# Patient Record
Sex: FEMALE | Race: BLACK OR AFRICAN AMERICAN | NOT HISPANIC OR LATINO | Employment: UNEMPLOYED | URBAN - METROPOLITAN AREA
[De-identification: names, ages, dates, MRNs, and addresses within clinical notes are randomized per-mention and may not be internally consistent; named-entity substitution may affect disease eponyms.]

---
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
## 2020-03-16 ENCOUNTER — HOSPITAL ENCOUNTER (EMERGENCY)
Facility: HOSPITAL | Age: 20
Discharge: HOME | End: 2020-03-16
Attending: EMERGENCY MEDICINE
Payer: MEDICAID

## 2020-03-16 VITALS
DIASTOLIC BLOOD PRESSURE: 78 MMHG | BODY MASS INDEX: 30.53 KG/M2 | WEIGHT: 190 LBS | HEIGHT: 66 IN | RESPIRATION RATE: 18 BRPM | HEART RATE: 88 BPM | TEMPERATURE: 98 F | OXYGEN SATURATION: 97 % | SYSTOLIC BLOOD PRESSURE: 118 MMHG

## 2020-03-16 DIAGNOSIS — N76.0 BV (BACTERIAL VAGINOSIS): Primary | ICD-10-CM

## 2020-03-16 DIAGNOSIS — B96.89 BV (BACTERIAL VAGINOSIS): Primary | ICD-10-CM

## 2020-03-16 DIAGNOSIS — R05.9 COUGH: ICD-10-CM

## 2020-03-16 LAB
B-HCG UR QL: NEGATIVE
BACTERIA GENITAL QL WET PREP: ABNORMAL
BILIRUB UR QL STRIP: NEGATIVE
C TRACH DNA SPEC QL NAA+PROBE: NOT DETECTED
CLARITY UR: CLEAR
CLUE CELLS VAG QL WET PREP: ABNORMAL
COLOR UR: NORMAL
CTP QC/QA: YES
FILAMENT FUNGI VAG WET PREP-#/AREA: ABNORMAL
GLUCOSE UR QL STRIP: NEGATIVE
HGB UR QL STRIP: NEGATIVE
KETONES UR QL STRIP: NEGATIVE
LEUKOCYTE ESTERASE UR QL STRIP: NEGATIVE
MICROSCOPIC COMMENT: NORMAL
N GONORRHOEA DNA SPEC QL NAA+PROBE: NOT DETECTED
NITRITE UR QL STRIP: NEGATIVE
PH UR STRIP: 7 [PH] (ref 5–8)
PROT UR QL STRIP: NEGATIVE
SP GR UR STRIP: 1 (ref 1–1.03)
SPECIMEN SOURCE: ABNORMAL
SQUAMOUS #/AREA URNS HPF: 1 /HPF
T VAGINALIS GENITAL QL WET PREP: ABNORMAL
URN SPEC COLLECT METH UR: NORMAL
UROBILINOGEN UR STRIP-ACNC: NEGATIVE EU/DL
WBC #/AREA VAG WET PREP: ABNORMAL
YEAST GENITAL QL WET PREP: ABNORMAL

## 2020-03-16 PROCEDURE — 81025 URINE PREGNANCY TEST: CPT | Performed by: NURSE PRACTITIONER

## 2020-03-16 PROCEDURE — 81000 URINALYSIS NONAUTO W/SCOPE: CPT

## 2020-03-16 PROCEDURE — 99284 EMERGENCY DEPT VISIT MOD MDM: CPT | Mod: 25

## 2020-03-16 PROCEDURE — 25000003 PHARM REV CODE 250: Performed by: NURSE PRACTITIONER

## 2020-03-16 PROCEDURE — 87210 SMEAR WET MOUNT SALINE/INK: CPT

## 2020-03-16 PROCEDURE — 87491 CHLMYD TRACH DNA AMP PROBE: CPT

## 2020-03-16 RX ORDER — BENZONATATE 100 MG/1
100 CAPSULE ORAL 3 TIMES DAILY PRN
Qty: 20 CAPSULE | Refills: 0 | Status: SHIPPED | OUTPATIENT
Start: 2020-03-16 | End: 2020-03-26

## 2020-03-16 RX ORDER — BENZONATATE 100 MG/1
100 CAPSULE ORAL
Status: COMPLETED | OUTPATIENT
Start: 2020-03-16 | End: 2020-03-16

## 2020-03-16 RX ORDER — METRONIDAZOLE 500 MG/1
500 TABLET ORAL 2 TIMES DAILY
Qty: 14 TABLET | Refills: 0 | Status: SHIPPED | OUTPATIENT
Start: 2020-03-16 | End: 2020-03-23

## 2020-03-16 RX ADMIN — BENZONATATE 100 MG: 100 CAPSULE ORAL at 08:03

## 2020-03-16 NOTE — ED PROVIDER NOTES
Encounter Date: 3/16/2020    SCRIBE #1 NOTE: I, Savana Sherman, am scribing for, and in the presence of,  Carole Lawrence NP. I have scribed the following portions of the note - Other sections scribed: HPI, ROS.       History     Chief Complaint   Patient presents with    Cough     cough for the past week and progressively getting worse. Took OTC Tylenol cold and flu without relief. Vaginal discharge/odor for the past week or so.     Vaginal Discharge     CC: Cough    HPI: This 19 y.o female, with no medical history, presents to the ED c/o a constant cough for the last 1x week. Pt reports also experiencing associated nasal congestion. She notes use of cold and flu medication for treatment without any improvement. She denies fever, chills or shortness of breath. No recent travel. No known sick contacts. Pt additionally c/o vaginal discharge. She reports experiencing bacterial vaginosis in the past and notes that the present symptoms are the same. Pt denies engaging in unprotected sexual intercourse. She has no concern for STD's and denies pain or any urinary symptoms. No other associated symptoms.    The history is provided by the patient.     Review of patient's allergies indicates:   Allergen Reactions    Peanut      History reviewed. No pertinent past medical history.  Past Surgical History:   Procedure Laterality Date    TONSILLECTOMY       History reviewed. No pertinent family history.  Social History     Tobacco Use    Smoking status: Never Smoker    Smokeless tobacco: Never Used   Substance Use Topics    Alcohol use: No    Drug use: Never     Review of Systems   Constitutional: Negative for chills and fever.   HENT: Positive for congestion (nasal). Negative for sore throat.    Eyes: Negative for visual disturbance.   Respiratory: Positive for cough. Negative for shortness of breath.    Cardiovascular: Negative for chest pain.   Gastrointestinal: Negative for nausea.   Genitourinary: Positive for  vaginal discharge. Negative for dysuria.   Musculoskeletal: Negative for neck pain.   Skin: Negative for rash.   Neurological: Negative for headaches.       Physical Exam     Initial Vitals [03/16/20 0657]   BP Pulse Resp Temp SpO2   118/83 84 20 98.2 °F (36.8 °C) 98 %      MAP       --         Physical Exam    Constitutional: She appears well-developed and well-nourished. She is not diaphoretic. No distress.   HENT:   Head: Normocephalic and atraumatic.   Right Ear: Hearing, tympanic membrane, external ear and ear canal normal.   Left Ear: Hearing, tympanic membrane, external ear and ear canal normal.   Nose: Mucosal edema present.   Mouth/Throat: No oropharyngeal exudate or posterior oropharyngeal erythema.   Eyes: Conjunctivae and EOM are normal. Pupils are equal, round, and reactive to light. Right eye exhibits no discharge. Left eye exhibits no discharge.   Neck: Normal range of motion. Neck supple.   Cardiovascular: Normal rate, regular rhythm, normal heart sounds and intact distal pulses.   Pulmonary/Chest: Breath sounds normal. No respiratory distress.   Abdominal: Soft. Bowel sounds are normal. There is no hepatosplenomegaly. There is no tenderness. There is no rigidity, no rebound, no guarding, no CVA tenderness, no tenderness at McBurney's point and negative Becerra's sign. No hernia.   Genitourinary:   Genitourinary Comments:  exam deferred by patient.   Musculoskeletal: Normal range of motion.   Neurological: She is alert and oriented to person, place, and time.   Skin: Skin is warm and dry.   Psychiatric: She has a normal mood and affect. Her behavior is normal.         ED Course   Procedures  Labs Reviewed   VAGINAL SCREEN - Abnormal; Notable for the following components:       Result Value    Clue Cells Few (*)     Bacteria - Vaginal Screen Few (*)     All other components within normal limits   C. TRACHOMATIS/N. GONORRHOEAE BY AMP DNA   URINALYSIS, REFLEX TO URINE CULTURE    Narrative:      Preferred Collection Type->Urine, Clean Catch   URINALYSIS MICROSCOPIC    Narrative:     Preferred Collection Type->Urine, Clean Catch   POCT URINE PREGNANCY          Imaging Results          X-Ray Chest PA And Lateral (Final result)  Result time 03/16/20 07:57:53    Final result by Kyle Massey MD (03/16/20 07:57:53)                 Impression:      There is no radiographic evidence for acute intrathoracic process.      Electronically signed by: Kyle Massey  Date:    03/16/2020  Time:    07:57             Narrative:    EXAMINATION:  XR CHEST PA AND LATERAL    CLINICAL HISTORY:  Cough    TECHNIQUE:  PA and lateral views of the chest were performed.    COMPARISON:  October 4, 2019    FINDINGS:  Two views of the chest are submitted.  The cardiomediastinal silhouette appears appropriate.  There is no evidence for confluent infiltrate or consolidation, significant pleural effusion or pneumothorax.  The osseous structures appear intact.                                 Medical Decision Making:   ED Management:  This is a 19-year-old female presenting for evaluation of cough x1 week.  No fever, chills, shortness of breath.  No recent travel.  Chest x-ray normal.  No pneumonia, pleural effusion, pneumothorax.  She is not hypoxic.  She is afebrile. Patient has no symptoms of respiratory distress. Patient denies any close contact with COVID-19 patient within the last 14 days.  Patient has not traveled outside of the country recently.  According to the CDC clinical criteria, the patient is not a person under investigation for Covid- 2019.  The patient will be referred to our Covid-19 testing center should the patient wish to be tested.    She also complaining of vaginal discharge.  BV present on vaginal swab.  No UTI.  She has no pain.  Doubt cervicitis, PID, TOA.  She is not concerned for STDs.  Will discharge patient with Flagyl.  Follow up with OBGYN for further evaluation.            Scribe Attestation:   Scribe  #1: I performed the above scribed service and the documentation accurately describes the services I performed. I attest to the accuracy of the note.                          Clinical Impression:       ICD-10-CM ICD-9-CM   1. BV (bacterial vaginosis) N76.0 616.10    B96.89 041.9   2. Cough R05 786.2         Disposition:   Disposition: Discharged  Condition: Stable     ED Disposition Condition    Discharge Stable        ED Prescriptions     Medication Sig Dispense Start Date End Date Auth. Provider    metroNIDAZOLE (FLAGYL) 500 MG tablet Take 1 tablet (500 mg total) by mouth 2 (two) times daily. for 7 days 14 tablet 3/16/2020 3/23/2020 Carole Lawrence NP    benzonatate (TESSALON) 100 MG capsule Take 1 capsule (100 mg total) by mouth 3 (three) times daily as needed for Cough. 20 capsule 3/16/2020 3/26/2020 Carole Lawrence NP        Follow-up Information     Follow up With Specialties Details Why Contact Info    Evert Pearson MD Neonatology Schedule an appointment as soon as possible for a visit  For follow-up [address] [facility] [address] [place] [address] [phone]      [facility]  Schedule an appointment as soon as possible for a visit  For follow-up [address] [facility] [address] [facility] [id number]    [facility] Go to  If symptoms worsen [address]  [facility] [address] [phone]                      I, LESTER Lawrence, personally performed the services described in this documentation. All medical record entries made by the scribe were at my direction and in my presence. I have reviewed the chart and agree that the record reflects my personal performance and is accurate and complete.               Carole Lawrence NP  03/16/20 0858

## 2020-03-16 NOTE — ED TRIAGE NOTES
Pt complains of productive cough and congestion x1 week.  Pt also is requesting an antibiotic for BV.  States her OB put on antibiotics and she took them all but the BV keeps occurring.

## 2020-03-16 NOTE — DISCHARGE INSTRUCTIONS

## 2020-11-18 ENCOUNTER — HOSPITAL ENCOUNTER (EMERGENCY)
Facility: OTHER | Age: 20
Discharge: HOME | End: 2020-11-18
Attending: EMERGENCY MEDICINE
Payer: MEDICAID

## 2020-11-18 VITALS
HEIGHT: 66 IN | RESPIRATION RATE: 16 BRPM | TEMPERATURE: 99 F | WEIGHT: 145 LBS | DIASTOLIC BLOOD PRESSURE: 66 MMHG | SYSTOLIC BLOOD PRESSURE: 125 MMHG | OXYGEN SATURATION: 99 % | BODY MASS INDEX: 23.3 KG/M2 | HEART RATE: 77 BPM

## 2020-11-18 DIAGNOSIS — N76.0 BV (BACTERIAL VAGINOSIS): ICD-10-CM

## 2020-11-18 DIAGNOSIS — B96.89 BV (BACTERIAL VAGINOSIS): ICD-10-CM

## 2020-11-18 DIAGNOSIS — R10.9 ABDOMINAL CRAMPING: Primary | ICD-10-CM

## 2020-11-18 LAB
ALBUMIN SERPL BCP-MCNC: 4 G/DL (ref 3.5–5.2)
ALP SERPL-CCNC: 81 U/L (ref 55–135)
ALT SERPL W/O P-5'-P-CCNC: 16 U/L (ref 10–44)
ANION GAP SERPL CALC-SCNC: 8 MMOL/L (ref 8–16)
AST SERPL-CCNC: 25 U/L (ref 10–40)
B-HCG UR QL: NEGATIVE
BACTERIA GENITAL QL WET PREP: ABNORMAL
BASOPHILS # BLD AUTO: 0.05 K/UL (ref 0–0.2)
BASOPHILS NFR BLD: 0.7 % (ref 0–1.9)
BILIRUB SERPL-MCNC: 0.3 MG/DL (ref 0.1–1)
BILIRUB UR QL STRIP: NEGATIVE
BUN SERPL-MCNC: 10 MG/DL (ref 6–20)
CALCIUM SERPL-MCNC: 9.5 MG/DL (ref 8.7–10.5)
CHLORIDE SERPL-SCNC: 104 MMOL/L (ref 95–110)
CLARITY UR: CLEAR
CLUE CELLS VAG QL WET PREP: ABNORMAL
CO2 SERPL-SCNC: 26 MMOL/L (ref 23–29)
COLOR UR: YELLOW
CREAT SERPL-MCNC: 0.7 MG/DL (ref 0.5–1.4)
CTP QC/QA: YES
DIFFERENTIAL METHOD: NORMAL
EOSINOPHIL # BLD AUTO: 0.3 K/UL (ref 0–0.5)
EOSINOPHIL NFR BLD: 3.7 % (ref 0–8)
ERYTHROCYTE [DISTWIDTH] IN BLOOD BY AUTOMATED COUNT: 12.1 % (ref 11.5–14.5)
EST. GFR  (AFRICAN AMERICAN): >60 ML/MIN/1.73 M^2
EST. GFR  (NON AFRICAN AMERICAN): >60 ML/MIN/1.73 M^2
EXTRA BLUE TOP RAINBOW: NORMAL
EXTRA GREEN TOP RAINBOW: NORMAL
EXTRA LAVENDER TOP RAINBOW: NORMAL
FILAMENT FUNGI VAG WET PREP-#/AREA: ABNORMAL
GLUCOSE SERPL-MCNC: 87 MG/DL (ref 70–110)
GLUCOSE UR QL STRIP: NEGATIVE
HCG INTACT+B SERPL-ACNC: <1.2 MIU/ML
HCT VFR BLD AUTO: 40.6 % (ref 37–48.5)
HCV AB SERPL QL IA: NEGATIVE
HGB BLD-MCNC: 13.3 G/DL (ref 12–16)
HGB UR QL STRIP: NEGATIVE
HIV 1+2 AB+HIV1 P24 AG SERPL QL IA: NEGATIVE
IMM GRANULOCYTES # BLD AUTO: 0.02 K/UL (ref 0–0.04)
IMM GRANULOCYTES NFR BLD AUTO: 0.3 % (ref 0–0.5)
KETONES UR QL STRIP: NEGATIVE
LEUKOCYTE ESTERASE UR QL STRIP: NEGATIVE
LYMPHOCYTES # BLD AUTO: 2.5 K/UL (ref 1–4.8)
LYMPHOCYTES NFR BLD: 33.7 % (ref 18–48)
MCH RBC QN AUTO: 29.8 PG (ref 27–31)
MCHC RBC AUTO-ENTMCNC: 32.8 G/DL (ref 32–36)
MCV RBC AUTO: 91 FL (ref 82–98)
MONOCYTES # BLD AUTO: 0.7 K/UL (ref 0.3–1)
MONOCYTES NFR BLD: 9.1 % (ref 4–15)
NEUTROPHILS # BLD AUTO: 4 K/UL (ref 1.8–7.7)
NEUTROPHILS NFR BLD: 52.5 % (ref 38–73)
NITRITE UR QL STRIP: NEGATIVE
NRBC BLD-RTO: 0 /100 WBC
PH UR STRIP: 8.5 [PH] (ref 5–8)
PLATELET # BLD AUTO: 298 K/UL (ref 150–350)
PMV BLD AUTO: 9.7 FL (ref 9.2–12.9)
POTASSIUM SERPL-SCNC: 4.4 MMOL/L (ref 3.5–5.1)
PROT SERPL-MCNC: 7.9 G/DL (ref 6–8.4)
PROT UR QL STRIP: NEGATIVE
RBC # BLD AUTO: 4.46 M/UL (ref 4–5.4)
SODIUM SERPL-SCNC: 138 MMOL/L (ref 136–145)
SP GR UR STRIP: 1.01 (ref 1–1.03)
SPECIMEN SOURCE: ABNORMAL
T VAGINALIS GENITAL QL WET PREP: ABNORMAL
URN SPEC COLLECT METH UR: NORMAL
UROBILINOGEN UR STRIP-ACNC: NEGATIVE EU/DL
WBC # BLD AUTO: 7.51 K/UL (ref 3.9–12.7)
WBC #/AREA VAG WET PREP: ABNORMAL
YEAST GENITAL QL WET PREP: ABNORMAL

## 2020-11-18 PROCEDURE — 99284 EMERGENCY DEPT VISIT MOD MDM: CPT

## 2020-11-18 PROCEDURE — 80053 COMPREHEN METABOLIC PANEL: CPT

## 2020-11-18 PROCEDURE — 87210 SMEAR WET MOUNT SALINE/INK: CPT

## 2020-11-18 PROCEDURE — 86703 HIV-1/HIV-2 1 RESULT ANTBDY: CPT

## 2020-11-18 PROCEDURE — 85025 COMPLETE CBC W/AUTO DIFF WBC: CPT

## 2020-11-18 PROCEDURE — 86803 HEPATITIS C AB TEST: CPT

## 2020-11-18 PROCEDURE — 81025 URINE PREGNANCY TEST: CPT | Performed by: EMERGENCY MEDICINE

## 2020-11-18 PROCEDURE — 81003 URINALYSIS AUTO W/O SCOPE: CPT

## 2020-11-18 PROCEDURE — 84702 CHORIONIC GONADOTROPIN TEST: CPT

## 2020-11-18 RX ORDER — METRONIDAZOLE 500 MG/1
500 TABLET ORAL EVERY 12 HOURS
Qty: 14 TABLET | Refills: 0 | Status: SHIPPED | OUTPATIENT
Start: 2020-11-18 | End: 2020-11-25

## 2020-11-18 RX ORDER — ACETAMINOPHEN 500 MG
1000 TABLET ORAL EVERY 6 HOURS PRN
Qty: 30 TABLET | Refills: 0 | Status: SHIPPED | OUTPATIENT
Start: 2020-11-18 | End: 2024-04-01

## 2020-11-18 NOTE — Clinical Note
"Cole"Velma Jackson was seen and treated in our emergency department on 11/18/2020.  She may return to work on 11/19/2020.       If you have any questions or concerns, please don't hesitate to call.      NIMA Jackson"

## 2020-11-18 NOTE — Clinical Note
"Garett Santiago accompanied Cole Jackson (Zmia) to the emergency department on 11/18/2020. They may return to work on 11/19/2020.      If you have any questions or concerns, please don't hesitate to call.      NIMA Jackson"

## 2020-11-19 NOTE — ED PROVIDER NOTES
Encounter Date: 2020       History     Chief Complaint   Patient presents with    Abdominal Cramping     to low abd since yesterday, LMP 10/22, trying to get pregnant, took UPT this am and it was negative     Afebrile 19 y.o. female who is  presents to the ED for evaluation of lower abdominal cramping.  Patient states that symptoms began yesterday.  She describes the symptoms as intermittent.  She c/o of increased vaginal discharge. She does report that her last menstrual period was on 10/22 and that her cycles are typically regular with no reported history of dysmenorrhea.  She states that she is attempting to get pregnant.  She states that she took a home pregnancy test which is negative this morning.  She states that she attempted to follow up with her gynecologist at [facility] however was reportedly told they would not see her without a positive UPT.  She does report a history of recurrent BV and that discharge feels similar to previous episodes although one episode of blood tinged discharge.  She denies any concern of STI. She has not tired ct medications for the symptoms. She denies any fever, upper abdominal pain, N/V/D or dysuria.     The history is provided by the patient.     Review of patient's allergies indicates:   Allergen Reactions    Peanut      History reviewed. No pertinent past medical history.  Past Surgical History:   Procedure Laterality Date    TONSILLECTOMY       History reviewed. No pertinent family history.  Social History     Tobacco Use    Smoking status: Never Smoker    Smokeless tobacco: Never Used   Substance Use Topics    Alcohol use: No    Drug use: Never     Review of Systems   Constitutional: Negative for appetite change, chills and fever.   HENT: Negative for sore throat.    Respiratory: Negative for shortness of breath.    Cardiovascular: Negative for chest pain.   Gastrointestinal: Positive for abdominal pain (abdominal cramping). Negative for constipation,  diarrhea, nausea and vomiting.   Genitourinary: Positive for pelvic pain and vaginal discharge. Negative for dysuria, flank pain, hematuria and vaginal bleeding.   Musculoskeletal: Negative for back pain.   Skin: Negative for rash.   Allergic/Immunologic: Negative for immunocompromised state.   Neurological: Negative for weakness.   Hematological: Does not bruise/bleed easily.       Physical Exam     Initial Vitals [11/18/20 1909]   BP Pulse Resp Temp SpO2   124/69 90 18 98.6 °F (37 °C) 100 %      MAP       --         Physical Exam    Nursing note and vitals reviewed.  Constitutional: Vital signs are normal. She appears well-developed and well-nourished. She is cooperative.  Non-toxic appearance. She does not appear ill. No distress.   HENT:   Head: Normocephalic and atraumatic.   Eyes: Conjunctivae and lids are normal.   Neck: Neck supple. No neck rigidity.   Cardiovascular: Normal rate and regular rhythm.   Pulmonary/Chest: Breath sounds normal. No respiratory distress. She has no wheezes. She has no rhonchi.   Abdominal: Soft. Normal appearance. There is no abdominal tenderness. There is no rigidity and no guarding.   Genitourinary:    Pelvic exam was performed with patient supine.   Uterus is not enlarged and not tender. Cervix exhibits no motion tenderness. Right adnexum displays no tenderness and no fullness. Left adnexum displays no tenderness and no fullness.    Vaginal discharge and erythema present.   There is erythema in the vagina.   Musculoskeletal: Normal range of motion.   Neurological: She is alert and oriented to person, place, and time. GCS eye subscore is 4. GCS verbal subscore is 5. GCS motor subscore is 6.   Skin: Skin is warm, dry and intact. No rash noted.   Psychiatric: She has a normal mood and affect. Her speech is normal and behavior is normal. Thought content normal.         ED Course   Procedures  Labs Reviewed   VAGINAL SCREEN - Abnormal; Notable for the following components:        Result Value    Clue Cells Occasional (*)     WBC - Vaginal Screen Rare (*)     Bacteria - Vaginal Screen Many (*)     All other components within normal limits   HIV 1 / 2 ANTIBODY   HEPATITIS C ANTIBODY   URINALYSIS, REFLEX TO URINE CULTURE    Narrative:     Specimen Source->Urine   BLUE-TOP TUBE   GREEN-TOP TUBE   LAVENDER-TOP TUBE   CBC W/ AUTO DIFFERENTIAL   COMPREHENSIVE METABOLIC PANEL   HCG, QUANTITATIVE, PREGNANCY   POCT URINE PREGNANCY          Imaging Results    None          Medical Decision Making:   History:   Old Medical Records: I decided to obtain old medical records.  Initial Assessment:   Well-appearing female presents the ED for evaluation of emergent lower abdominal cramping.  She expresses concern of possible pregnancy.  Last menstrual period was 10/22; negative UPT at home today.  She appears in no distress.  Lungs CTA; heart regular rate rhythm.  Abdomen is soft and nontender.   exam reveals mild erythema of the vaginal canal with moderate white discharge; no cervical motion tenderness, adnexal tenderness or bleeding noted.  Differential Diagnosis:   Differential Diagnosis includes, but is not limited to:  STI, HSV, BV, PID, TOA, vaginal foreign body, vaginal trauma, ovarian torsion, ovarian cyst, UTI/pyelonephritis, pregnancy complication, dysmenorrhea, mittelschmertz, appendicitis, nephrolithiasis, appendicitis, colitis, diverticulitis,    Clinical Tests:   Lab Tests: Ordered and Reviewed  ED Management:  Patient continues to be well-appearing in no distress however given concern of pregnancy UPT was obtained which is negative.  We did discuss that this could be related to early pregnancy given her last menstrual period was just 10/22.  Patient was requesting a blood test.  We did discuss not emergent at this time given no abdominal tenderness a significant abnormalities of work up however given that she has some abdominal pain obliged.  Labs reveal clue cells consistent with BV.   Remaining workup unremarkable.  Did consider TOA or ectopic however low suspicion given normal HCG and no significant tenderness on  exam are abdominal exam.  Discussed Tylenol for pain and will send home with Flagyl for BV. Strict instructions to follow up with primary care physician or reference provided for further assessment and evaluation. Given instructions to return for any acute symptoms and verbalized understanding of this medical plan.                               Clinical Impression:       ICD-10-CM ICD-9-CM   1. Abdominal cramping  R10.9 789.00   2. BV (bacterial vaginosis)  N76.0 616.10    B96.89 041.9                          ED Disposition Condition    Discharge Stable        ED Prescriptions     Medication Sig Dispense Start Date End Date Auth. Provider    metroNIDAZOLE (FLAGYL) 500 MG tablet Take 1 tablet (500 mg total) by mouth every 12 (twelve) hours. for 7 days 14 tablet 11/18/2020 11/25/2020 NIMA Jackson    acetaminophen (TYLENOL) 500 MG tablet Take 2 tablets (1,000 mg total) by mouth every 6 (six) hours as needed. 30 tablet 11/18/2020  NIMA Jackson        Follow-up Information     Follow up With Specialties Details Why Contact Info    Your Ob/GYN  Go to                                          NIMA Jackson  11/19/20 2009

## 2020-11-19 NOTE — ED TRIAGE NOTES
"Pt reports to ED with multiple complaints. Pt states she has been feeling some generealized fatigue and body soreness that has been going on since yesterday. Pt is also reporting some intermittent lower abd cramping. Pt reports some nausea, no vomiting. States she has "been trying to get pregnant," LMP 10/22  "

## 2020-12-09 ENCOUNTER — HOSPITAL ENCOUNTER (EMERGENCY)
Facility: OTHER | Age: 20
Discharge: HOME | End: 2020-12-09
Attending: EMERGENCY MEDICINE
Payer: MEDICAID

## 2020-12-09 VITALS
OXYGEN SATURATION: 98 % | SYSTOLIC BLOOD PRESSURE: 111 MMHG | TEMPERATURE: 98 F | BODY MASS INDEX: 30.18 KG/M2 | DIASTOLIC BLOOD PRESSURE: 60 MMHG | RESPIRATION RATE: 18 BRPM | WEIGHT: 187 LBS | HEART RATE: 64 BPM

## 2020-12-09 DIAGNOSIS — O46.90 VAGINAL BLEEDING IN PREGNANCY: Primary | ICD-10-CM

## 2020-12-09 LAB
ABO + RH BLD: NORMAL
ALBUMIN SERPL BCP-MCNC: 3.9 G/DL (ref 3.5–5.2)
ALP SERPL-CCNC: 58 U/L (ref 55–135)
ALT SERPL W/O P-5'-P-CCNC: 17 U/L (ref 10–44)
ANION GAP SERPL CALC-SCNC: 6 MMOL/L (ref 8–16)
AST SERPL-CCNC: 21 U/L (ref 10–40)
B-HCG UR QL: POSITIVE
BACTERIA GENITAL QL WET PREP: ABNORMAL
BASOPHILS # BLD AUTO: 0.06 K/UL (ref 0–0.2)
BASOPHILS NFR BLD: 0.7 % (ref 0–1.9)
BILIRUB SERPL-MCNC: 0.3 MG/DL (ref 0.1–1)
BUN SERPL-MCNC: 7 MG/DL (ref 6–20)
CALCIUM SERPL-MCNC: 9.3 MG/DL (ref 8.7–10.5)
CHLORIDE SERPL-SCNC: 103 MMOL/L (ref 95–110)
CLUE CELLS VAG QL WET PREP: ABNORMAL
CO2 SERPL-SCNC: 28 MMOL/L (ref 23–29)
CREAT SERPL-MCNC: 0.7 MG/DL (ref 0.5–1.4)
CTP QC/QA: YES
DIFFERENTIAL METHOD: ABNORMAL
EOSINOPHIL # BLD AUTO: 0.3 K/UL (ref 0–0.5)
EOSINOPHIL NFR BLD: 3.4 % (ref 0–8)
ERYTHROCYTE [DISTWIDTH] IN BLOOD BY AUTOMATED COUNT: 12.4 % (ref 11.5–14.5)
EST. GFR  (AFRICAN AMERICAN): >60 ML/MIN/1.73 M^2
EST. GFR  (NON AFRICAN AMERICAN): >60 ML/MIN/1.73 M^2
FILAMENT FUNGI VAG WET PREP-#/AREA: ABNORMAL
GLUCOSE SERPL-MCNC: 91 MG/DL (ref 70–110)
HCG INTACT+B SERPL-ACNC: 7668 MIU/ML
HCT VFR BLD AUTO: 36.8 % (ref 37–48.5)
HGB BLD-MCNC: 12.2 G/DL (ref 12–16)
IMM GRANULOCYTES # BLD AUTO: 0.02 K/UL (ref 0–0.04)
IMM GRANULOCYTES NFR BLD AUTO: 0.2 % (ref 0–0.5)
LYMPHOCYTES # BLD AUTO: 2.8 K/UL (ref 1–4.8)
LYMPHOCYTES NFR BLD: 33.9 % (ref 18–48)
MCH RBC QN AUTO: 29.8 PG (ref 27–31)
MCHC RBC AUTO-ENTMCNC: 33.2 G/DL (ref 32–36)
MCV RBC AUTO: 90 FL (ref 82–98)
MONOCYTES # BLD AUTO: 0.7 K/UL (ref 0.3–1)
MONOCYTES NFR BLD: 7.9 % (ref 4–15)
NEUTROPHILS # BLD AUTO: 4.5 K/UL (ref 1.8–7.7)
NEUTROPHILS NFR BLD: 53.9 % (ref 38–73)
NRBC BLD-RTO: 0 /100 WBC
PLATELET # BLD AUTO: 311 K/UL (ref 150–350)
PMV BLD AUTO: 9.3 FL (ref 9.2–12.9)
POTASSIUM SERPL-SCNC: 3.6 MMOL/L (ref 3.5–5.1)
PROT SERPL-MCNC: 7.2 G/DL (ref 6–8.4)
RBC # BLD AUTO: 4.09 M/UL (ref 4–5.4)
SODIUM SERPL-SCNC: 137 MMOL/L (ref 136–145)
SPECIMEN SOURCE: ABNORMAL
T VAGINALIS GENITAL QL WET PREP: ABNORMAL
WBC # BLD AUTO: 8.35 K/UL (ref 3.9–12.7)
WBC #/AREA VAG WET PREP: ABNORMAL
YEAST GENITAL QL WET PREP: ABNORMAL

## 2020-12-09 PROCEDURE — 84702 CHORIONIC GONADOTROPIN TEST: CPT

## 2020-12-09 PROCEDURE — 86901 BLOOD TYPING SEROLOGIC RH(D): CPT

## 2020-12-09 PROCEDURE — 80053 COMPREHEN METABOLIC PANEL: CPT

## 2020-12-09 PROCEDURE — 99284 EMERGENCY DEPT VISIT MOD MDM: CPT | Mod: 25

## 2020-12-09 PROCEDURE — 81025 URINE PREGNANCY TEST: CPT | Performed by: PHYSICIAN ASSISTANT

## 2020-12-09 PROCEDURE — 87210 SMEAR WET MOUNT SALINE/INK: CPT

## 2020-12-09 PROCEDURE — 85025 COMPLETE CBC W/AUTO DIFF WBC: CPT

## 2020-12-10 NOTE — ED PROVIDER NOTES
Encounter Date: 2020    SCRIBE #1 NOTE: IStephanie am scribing for, and in the presence of, Dr. Haddad.   SCRIBE #2 NOTE: ILeticia am scribing for, and in the presence of, Dr. Haddad.     History     Chief Complaint   Patient presents with    Vaginal Bleeding     reports spotting with onset today, reports treating yeast infection, reports 7W pregnant     Time seen by provider: 9:24 PM    This is a 19 y.o. female A0 who presents with complaint of vaginal bleeding (described as spotting) and cramping for 3 days. She reports currently treating a yeast infection that began 5 days ago with cream. She notes that she first discovered the spotting of blood on the applicator of the yeast infection cream. The cramping is described as a 6/10, worse tonight. She notes feeling dehydrated and fatigued in general. She denies any fever, chills, cough, rhinorrhea, decreased urine, or painful urination. She reports PSHx of tonsillectomy. This is the extent of the patient's complaints at this time.    The history is provided by the patient.     Review of patient's allergies indicates:   Allergen Reactions    Peanut      History reviewed. No pertinent past medical history.  Past Surgical History:   Procedure Laterality Date    TONSILLECTOMY       History reviewed. No pertinent family history.  Social History     Tobacco Use    Smoking status: Never Smoker    Smokeless tobacco: Never Used   Substance Use Topics    Alcohol use: No    Drug use: Never     Review of Systems   Constitutional: Positive for fatigue. Negative for chills and fever.   HENT: Negative for rhinorrhea and sore throat.    Eyes: Negative for redness.   Respiratory: Negative for cough.    Cardiovascular: Negative for chest pain.   Gastrointestinal: Negative for diarrhea, nausea and vomiting.        Positive for abdominal cramping.   Genitourinary: Positive for vaginal bleeding. Negative for decreased urine volume and dysuria.    Musculoskeletal: Negative for back pain.   Skin: Negative for pallor.   Neurological: Negative for headaches.       Physical Exam     Initial Vitals [12/09/20 2056]   BP Pulse Resp Temp SpO2   131/60 77 18 98.2 °F (36.8 °C) 99 %      MAP       --         Physical Exam    Nursing note and vitals reviewed.  Constitutional: She appears well-developed and well-nourished. She is not diaphoretic. No distress.   HENT:   Head: Normocephalic and atraumatic.   Eyes: EOM are normal. Pupils are equal, round, and reactive to light.   Neck: Normal range of motion. Neck supple.   Cardiovascular: Normal rate, regular rhythm and normal heart sounds. Exam reveals no gallop and no friction rub.    No murmur heard.  Pulmonary/Chest: Breath sounds normal. No respiratory distress. She has no wheezes. She has no rhonchi. She has no rales.   Abdominal: Soft. Bowel sounds are normal. She exhibits no distension. There is no abdominal tenderness. There is no rebound and no guarding.   Genitourinary:    Genitourinary Comments: Pelvic exam:  Normal external genitalia.  There is no evidence of current or recent bleeding, no blood evident in vaginal vault.  Cervix appears normal.  There is moderate white discharge present.  Cervix is closed.  No CMT.     Musculoskeletal: Normal range of motion. No tenderness or edema.   Neurological: She is alert and oriented to person, place, and time.   Skin: Skin is warm and dry.   Psychiatric: She has a normal mood and affect. Her behavior is normal. Thought content normal.         ED Course   Procedures  Labs Reviewed   CBC W/ AUTO DIFFERENTIAL - Abnormal; Notable for the following components:       Result Value    Hematocrit 36.8 (*)     All other components within normal limits   COMPREHENSIVE METABOLIC PANEL - Abnormal; Notable for the following components:    Anion Gap 6 (*)     All other components within normal limits   VAGINAL SCREEN - Abnormal; Notable for the following components:    Clue Cells  Moderate (*)     WBC - Vaginal Screen Occasional (*)     Bacteria - Vaginal Screen Moderate (*)     All other components within normal limits   POCT URINE PREGNANCY - Abnormal; Notable for the following components:    POC Preg Test, Ur Positive (*)     All other components within normal limits   HCG, QUANTITATIVE, PREGNANCY   GROUP & RH          Imaging Results          US OB <14 Wks TransAbd & TransVag, Single Gestation (XPD) (Final result)  Result time 12/09/20 23:04:36    Final result by Gustavo Emmanuel MD (12/09/20 23:04:36)                 Impression:      Single intrauterine gestational sac suggesting intrauterine pregnancy with gestational age of 6 weeks 0 days by mean sac diameter.  Embryo and cardiac activity are not well delineated at this time.  Suggest follow-up ultrasound in 7-10 days and beta HCG correlation as clinically appropriate to ensure that this is a normal ongoing pregnancy.      Electronically signed by: Gustavo Emmanuel MD  Date:    12/09/2020  Time:    23:04             Narrative:    EXAMINATION:  US OB <14 WEEKS, TRANSABDOM & TRANSVAG, SINGLE GESTATION (XPD)    CLINICAL HISTORY:  Vag Bleeding;    TECHNIQUE:  Transabdominal sonography of the pelvis was performed, followed by transvaginal sonography to better evaluate the uterus and ovaries.    COMPARISON:  None.    FINDINGS:  The uterus measures 7.2 x 4.3 x 5.8 cm.  A single gestational sac is seen within the uterus with mean sac diameter of 11.9 mm.  The sac contains a yolk sac which measures approximately 3 mm.  Embryo and cardiac activity are not well delineated.  There is a questionable small fetal pole identified on cine images.    The ovaries are normal in size and sonographic appearance.  Right ovary measures 2.9 x 1.6 x 2.2 cm.  Left ovary measures 3.2 x 1.9 x 3.0 cm and contains a 2.2 cm filled in cyst with rim vascularity consistent with a corpus luteum cyst.  There are normal arterial and venous waveforms in both  ovaries.    Trace free fluid is seen in the cul-de-sac.                                 Medical Decision Making:   History:   Old Medical Records: I decided to obtain old medical records.  Clinical Tests:   Lab Tests: Ordered and Reviewed  Radiological Study: Ordered and Reviewed  ED Management:  Emergent evaluation 19-year-old female who presents complaint of vaginal bleeding in early pregnancy.  Patient states she has an OBGYN established at [facility].  She is currently using medication for a yeast infection.  On exam there is white discharge present, unclear this represents treatment for the yeast infection versus discharge from it.  This was diagnosed by her OBGYN.  There is no evidence of active bleeding or recent bleeding.  She is Rh positive and would not require RhoGAM any way.  Ultrasound shows early IUP, no ectopic or torsion.  Although there clue cells on vaginal screen, given current yeast infection and treatment for it, will defer to OBGYN.  Patient is encouraged close follow-up, pelvic rest, and given bleeding precautions.  She is advised close follow-up with her OBGYN and to return for any new or worsening symptoms.            Scribe Attestation:   Scribe #1: I performed the above scribed service and the documentation accurately describes the services I performed. I attest to the accuracy of the note.  Scribe #2: I performed the above scribed service and the documentation accurately describes the services I performed. I attest to the accuracy of the note.    Attending Attestation:           Physician Attestation for Scribe:  Physician Attestation Statement for Scribe #1: I, Dr. Haddad, reviewed documentation, as scribed by Stephanie Medina in my presence, and it is both accurate and complete.   Physician Attestation Statement for Scribe #2: I, Dr. Haddad, reviewed documentation, as scribed by Leticia Layne in my presence, and it is both accurate and complete. I also acknowledge and confirm the content  of the note done by Scribe #1.                        Clinical Impression:     ICD-10-CM ICD-9-CM   1. Vaginal bleeding in pregnancy  O46.90 641.93                          ED Disposition Condition    Discharge Stable        ED Prescriptions     None        Follow-up Information     Follow up With Specialties Details Why Contact Info    Your regular OBGYN  Call in 1 day Schedule close follow-up     [facility]  As needed, If symptoms worsen [address] [address]  [phone]                                       Rohini Haddad MD  12/10/20 6413

## 2020-12-10 NOTE — DISCHARGE INSTRUCTIONS
Put nothing in your vagina until your cleared by her OBGYN to do so.  This means no sex, no tampons, no douching.  Return to the emergency room for heavy bleeding, meaning more than 1 pad per hour for 3 hr in a row, or if you have increased pain, fever, or other concerning symptoms.

## 2020-12-10 NOTE — ED TRIAGE NOTES
Pt arrived with c/o lower abd cramping and spotting since yesterday.  States she is 7 weeks pregnant, reprots this is her first pregnancy.  Pt states the spotting has gotten better since yesterday, but the cramping has gotten worse.  Denies taking any medication for the pain.  Denies any urinary symptoms.  Reports she is using vaginal cream for a yeast infection currently.

## 2023-01-05 ENCOUNTER — HOSPITAL ENCOUNTER (EMERGENCY)
Facility: OTHER | Age: 23
Discharge: HOME | End: 2023-01-05
Attending: EMERGENCY MEDICINE
Payer: MEDICAID

## 2023-01-05 VITALS
SYSTOLIC BLOOD PRESSURE: 119 MMHG | OXYGEN SATURATION: 97 % | BODY MASS INDEX: 28.93 KG/M2 | RESPIRATION RATE: 17 BRPM | HEIGHT: 66 IN | TEMPERATURE: 98 F | DIASTOLIC BLOOD PRESSURE: 71 MMHG | WEIGHT: 180 LBS | HEART RATE: 75 BPM

## 2023-01-05 DIAGNOSIS — J06.9 UPPER RESPIRATORY TRACT INFECTION, UNSPECIFIED TYPE: Primary | ICD-10-CM

## 2023-01-05 DIAGNOSIS — R06.02 SHORTNESS OF BREATH: ICD-10-CM

## 2023-01-05 LAB
ANION GAP SERPL CALC-SCNC: 8 MMOL/L (ref 8–16)
B-HCG UR QL: NEGATIVE
BASOPHILS # BLD AUTO: 0.02 K/UL (ref 0–0.2)
BASOPHILS NFR BLD: 0.3 % (ref 0–1.9)
BNP SERPL-MCNC: 19 PG/ML (ref 0–99)
BUN SERPL-MCNC: 7 MG/DL (ref 6–20)
CALCIUM SERPL-MCNC: 9.8 MG/DL (ref 8.7–10.5)
CHLORIDE SERPL-SCNC: 107 MMOL/L (ref 95–110)
CO2 SERPL-SCNC: 23 MMOL/L (ref 23–29)
CREAT SERPL-MCNC: 0.7 MG/DL (ref 0.5–1.4)
CTP QC/QA: YES
CTP QC/QA: YES
D DIMER PPP IA.FEU-MCNC: 0.31 MG/L FEU
DIFFERENTIAL METHOD: ABNORMAL
EOSINOPHIL # BLD AUTO: 0.2 K/UL (ref 0–0.5)
EOSINOPHIL NFR BLD: 2.6 % (ref 0–8)
ERYTHROCYTE [DISTWIDTH] IN BLOOD BY AUTOMATED COUNT: 12.3 % (ref 11.5–14.5)
EST. GFR  (NO RACE VARIABLE): >60 ML/MIN/1.73 M^2
GLUCOSE SERPL-MCNC: 93 MG/DL (ref 70–110)
HCT VFR BLD AUTO: 42.4 % (ref 37–48.5)
HGB BLD-MCNC: 14.8 G/DL (ref 12–16)
IMM GRANULOCYTES # BLD AUTO: 0.02 K/UL (ref 0–0.04)
IMM GRANULOCYTES NFR BLD AUTO: 0.3 % (ref 0–0.5)
LYMPHOCYTES # BLD AUTO: 2 K/UL (ref 1–4.8)
LYMPHOCYTES NFR BLD: 28.3 % (ref 18–48)
MCH RBC QN AUTO: 31.2 PG (ref 27–31)
MCHC RBC AUTO-ENTMCNC: 34.9 G/DL (ref 32–36)
MCV RBC AUTO: 90 FL (ref 82–98)
MONOCYTES # BLD AUTO: 0.5 K/UL (ref 0.3–1)
MONOCYTES NFR BLD: 7 % (ref 4–15)
NEUTROPHILS # BLD AUTO: 4.4 K/UL (ref 1.8–7.7)
NEUTROPHILS NFR BLD: 61.5 % (ref 38–73)
NRBC BLD-RTO: 0 /100 WBC
PLATELET # BLD AUTO: 325 K/UL (ref 150–450)
PMV BLD AUTO: 9.4 FL (ref 9.2–12.9)
POTASSIUM SERPL-SCNC: 3.5 MMOL/L (ref 3.5–5.1)
RBC # BLD AUTO: 4.74 M/UL (ref 4–5.4)
SARS-COV-2 RDRP RESP QL NAA+PROBE: NEGATIVE
SODIUM SERPL-SCNC: 138 MMOL/L (ref 136–145)
TROPONIN I SERPL DL<=0.01 NG/ML-MCNC: <0.006 NG/ML (ref 0–0.03)
WBC # BLD AUTO: 7.17 K/UL (ref 3.9–12.7)

## 2023-01-05 PROCEDURE — 84484 ASSAY OF TROPONIN QUANT: CPT

## 2023-01-05 PROCEDURE — 85025 COMPLETE CBC W/AUTO DIFF WBC: CPT

## 2023-01-05 PROCEDURE — 94640 AIRWAY INHALATION TREATMENT: CPT

## 2023-01-05 PROCEDURE — 93005 ELECTROCARDIOGRAM TRACING: CPT

## 2023-01-05 PROCEDURE — 25000003 PHARM REV CODE 250

## 2023-01-05 PROCEDURE — 85379 FIBRIN DEGRADATION QUANT: CPT

## 2023-01-05 PROCEDURE — 99285 EMERGENCY DEPT VISIT HI MDM: CPT | Mod: 25

## 2023-01-05 PROCEDURE — 94761 N-INVAS EAR/PLS OXIMETRY MLT: CPT

## 2023-01-05 PROCEDURE — 83880 ASSAY OF NATRIURETIC PEPTIDE: CPT

## 2023-01-05 PROCEDURE — 87635 SARS-COV-2 COVID-19 AMP PRB: CPT | Performed by: PHYSICIAN ASSISTANT

## 2023-01-05 PROCEDURE — 93010 EKG 12-LEAD: ICD-10-PCS | Mod: ,,, | Performed by: INTERNAL MEDICINE

## 2023-01-05 PROCEDURE — 25000242 PHARM REV CODE 250 ALT 637 W/ HCPCS

## 2023-01-05 PROCEDURE — 80048 BASIC METABOLIC PNL TOTAL CA: CPT

## 2023-01-05 PROCEDURE — 81025 URINE PREGNANCY TEST: CPT | Performed by: PHYSICIAN ASSISTANT

## 2023-01-05 PROCEDURE — 93010 ELECTROCARDIOGRAM REPORT: CPT | Mod: ,,, | Performed by: INTERNAL MEDICINE

## 2023-01-05 RX ORDER — IPRATROPIUM BROMIDE AND ALBUTEROL SULFATE 2.5; .5 MG/3ML; MG/3ML
3 SOLUTION RESPIRATORY (INHALATION)
Status: COMPLETED | OUTPATIENT
Start: 2023-01-05 | End: 2023-01-05

## 2023-01-05 RX ORDER — LORAZEPAM 1 MG/1
0.5 TABLET ORAL EVERY 6 HOURS PRN
Qty: 5 TABLET | Refills: 0 | Status: SHIPPED | OUTPATIENT
Start: 2023-01-05 | End: 2024-04-01

## 2023-01-05 RX ORDER — ALBUTEROL SULFATE 90 UG/1
1-2 AEROSOL, METERED RESPIRATORY (INHALATION) EVERY 6 HOURS PRN
Qty: 6.7 G | Refills: 0 | Status: SHIPPED | OUTPATIENT
Start: 2023-01-05 | End: 2024-01-05

## 2023-01-05 RX ORDER — LORAZEPAM 0.5 MG/1
0.5 TABLET ORAL
Status: COMPLETED | OUTPATIENT
Start: 2023-01-05 | End: 2023-01-05

## 2023-01-05 RX ORDER — FLUTICASONE PROPIONATE 50 MCG
1 SPRAY, SUSPENSION (ML) NASAL DAILY
Qty: 9.9 ML | Refills: 0 | Status: SHIPPED | OUTPATIENT
Start: 2023-01-05 | End: 2024-04-01

## 2023-01-05 RX ORDER — CETIRIZINE HYDROCHLORIDE 10 MG/1
10 TABLET ORAL DAILY
Qty: 30 TABLET | Refills: 0 | Status: SHIPPED | OUTPATIENT
Start: 2023-01-05 | End: 2024-01-05

## 2023-01-05 RX ADMIN — IPRATROPIUM BROMIDE AND ALBUTEROL SULFATE 3 ML: 2.5; .5 SOLUTION RESPIRATORY (INHALATION) at 12:01

## 2023-01-05 RX ADMIN — LORAZEPAM 0.5 MG: 0.5 TABLET ORAL at 01:01

## 2023-01-05 NOTE — ED TRIAGE NOTES
Pt presents to ED c/o SOB onset last night when coughing. Pt also reporting mild dizziness and headache, which she thinks is caused by being SOB. Pt tachypneic upon assessment, appears anxious and tearful. Lung sounds clear/equal BL. Denies any fever at home, N/V/D, chest pain.

## 2023-01-05 NOTE — ED PROVIDER NOTES
Encounter Date: 1/5/2023       History     Chief Complaint   Patient presents with    Shortness of Breath     Pt states she feels that she cannot breath since last night which started when she was coughing. Lungs are clear to auscultation at triage. Pt is tearful and states she feels like she is having an anxiety attack. Pt is hyperventilatory but redirectable to take slow breaths.     This is a 22-year-old female who presents to the ED with complaints of shortness of breath that started suddenly last night.  Patient states she was at home when she started feeling significantly short of breath while coughing.  Patient notes that she has had a cough and sinus congestion for about a week that has been improving.  She denies any history of asthma or need for inhaler use.  She describes associated lightheadedness, chest tightness, and weakness in her bilateral lower extremities.  No aggravating or alleviating factors.  Patient has not used anything at home for alleviation of symptoms.  Denies history of blood clots, oral contraceptive use, recent extensive travel or procedures.  Denies fever, chills, N/V/D, syncope.    The history is provided by the patient.   Review of patient's allergies indicates:   Allergen Reactions    Peanut      No past medical history on file.  Past Surgical History:   Procedure Laterality Date    TONSILLECTOMY       No family history on file.  Social History     Tobacco Use    Smoking status: Never    Smokeless tobacco: Never   Substance Use Topics    Alcohol use: No    Drug use: Never     Review of Systems   Constitutional:  Negative for fever.   HENT:  Positive for congestion. Negative for sore throat.    Respiratory:  Positive for cough and shortness of breath.    Cardiovascular:  Negative for chest pain.   Gastrointestinal:  Negative for nausea.   Genitourinary:  Negative for dysuria.   Musculoskeletal:  Negative for back pain.   Skin:  Negative for rash.   Neurological:  Positive for  weakness and light-headedness.   Hematological:  Does not bruise/bleed easily.     Physical Exam     Initial Vitals [01/05/23 0905]   BP Pulse Resp Temp SpO2   120/83 98 (!) 25 97.8 °F (36.6 °C) 100 %      MAP       --         Physical Exam    Constitutional: She appears well-developed and well-nourished. She is not diaphoretic.  Non-toxic appearance. She does not appear ill.   Patient is tearful on exam   HENT:   Head: Normocephalic and atraumatic.   Eyes: Conjunctivae are normal.   Neck: Neck supple.   Cardiovascular:  Regular rhythm and normal heart sounds.   Tachycardia present.         No murmur heard.  Pulmonary/Chest: Effort normal. Tachypnea noted. No respiratory distress. She has no decreased breath sounds. She has wheezes (Diffuse expiratory wheezes). She has no rhonchi. She has no rales.   Musculoskeletal:      Cervical back: Neck supple.     Neurological: She is alert and oriented to person, place, and time. GCS eye subscore is 4. GCS verbal subscore is 5. GCS motor subscore is 6.   Skin: Skin is warm, dry and intact.   Psychiatric: She has a normal mood and affect. Her speech is normal and behavior is normal.       ED Course   Procedures  Labs Reviewed   CBC W/ AUTO DIFFERENTIAL - Abnormal; Notable for the following components:       Result Value    MCH 31.2 (*)     All other components within normal limits   D DIMER, QUANTITATIVE   BASIC METABOLIC PANEL   TROPONIN I   B-TYPE NATRIURETIC PEPTIDE   SARS-COV-2 RDRP GENE   POCT URINE PREGNANCY     EKG Readings: (Independently Interpreted)   Normal sinus rhythm at rate of 78.  Nonspecific ST-T-wave changes.  T-wave inversions in V3 and V4.  No ST elevation.  No STEMI.     Imaging Results              X-Ray Chest PA And Lateral (Final result)  Result time 01/05/23 12:14:32      Final result by Sebastian Merino III, MD (01/05/23 12:14:32)                   Impression:      No acute process seen.      Electronically signed by: Sebastian Merino  MD  Date:    01/05/2023  Time:    12:14               Narrative:    EXAMINATION:  XR CHEST PA AND LATERAL    CLINICAL HISTORY:  Shortness of breath    FINDINGS:  Heart size is normal.  Lungs are clear.  The bones show nothing unusual.                                       X-Ray Chest AP Portable (Final result)  Result time 01/05/23 10:43:04      Final result by Sebastian Merino III, MD (01/05/23 10:43:04)                   Narrative:    EXAMINATION:  XR CHEST AP PORTABLE    CLINICAL HISTORY:  COVID-19;    FINDINGS:  Chest one view: Heart size is normal.  Left lung is clear.  There is a possible right middle lobe pneumonia.  Lateral views recommended.      Electronically signed by: Sebastian Merino MD  Date:    01/05/2023  Time:    10:43                                     Medications   LORazepam tablet 0.5 mg (has no administration in time range)   albuterol-ipratropium 2.5 mg-0.5 mg/3 mL nebulizer solution 3 mL (3 mLs Nebulization Given 1/5/23 1235)     Medical Decision Making:   Initial Assessment:   Urgent evaluation of a 22-year-old female with shortness a breath.  Patient has had cold symptoms for about a week.  Plan for chest x-ray, basic labs, D-dimer.  Will also do cardiac workup.  Differential Diagnosis:   Differential Diagnosis includes, but is not limited to:  PE, MI/ACS, pneumothorax, pericardial effusion/tamonade, pneumonia, lung abscess, pericarditis/myocarditis, pleural effusion, lung mass, CHF exacerbation, asthma exacerbation, COPD exacerbation, aspirated/ingested foreign body, airway obstruction, CO poisoning, anemia, metabolic derangement, allergy/atopy, influenza, viral URI, viral syndrome.            ED Course as of 01/05/23 1256   Thu Jan 05, 2023   1127 Portable chest x-ray was ordered in triage and was not sufficient view, added on lateral view.  Possible middle lobe pneumonia. [BRUNA]   1128 D-Dimer: 0.31 [BRUNA]   1128 WBC: 7.17 [BRUNA]   1128 Patient continues to be tearful, states that she is  extremely nervous about her shortness of breath [BRUNA]   1209 Troponin I: <0.006 [BRUNA]   1209 BNP: 19 [BRUNA]   1254 Chest x-ray PA and lateral without concern for any pneumonia or cardiopulmonary process. [BRUNA]   1255 Patient continues to be anxious which I suspect is causing her tingling and weakness in her bilateral lower extremities, will administer 0.5mg Ativan.  RT at bedside to administer breathing treatment at this time. [BRUNA]   1255 Plan to discharge home with albuterol inhaler, Zyrtec, fluticasone intranasal spray for symptomatic relief at home.  She was instructed to rest and increase oral hydration over the next couple of days.  Encouraged follow-up with her primary care physician next week.  After taking into careful account the patient's history, physical exam findings, as well as empirical and objective data obtained throughout ED workup, I feel no emergent medical condition has been identified. No further evaluation or admission was felt to be required, and the patient is stable for discharge from the ED. The patient was updated with test results, overall clinical impression, and recommended further plan of care, including discharge instructions as provided and outpatient follow-up for continued evaluation and management as needed. All questions were answered. The patient expressed understanding and agreed with current plan for discharge and follow-up plan of care. Strict ED return precautions were provided, including return/worsening of current symptoms, new symptoms, or any other concerns.   [BRUNA]   1256 SARS-CoV-2 RNA, Amplification, Qual: Negative [BRUNA]      ED Course User Index  [BRUNA] Jessica Mercado PA-C                 Clinical Impression:   Final diagnoses:  [R06.02] Shortness of breath  [J06.9] Upper respiratory tract infection, unspecified type (Primary)        ED Disposition Condition    Discharge Stable          ED Prescriptions       Medication Sig Dispense Start Date End Date Auth. Provider     albuterol (PROVENTIL/VENTOLIN HFA) 90 mcg/actuation inhaler Inhale 1-2 puffs into the lungs every 6 (six) hours as needed for Wheezing. Rescue 6.7 g 1/5/2023 1/5/2024 Jessica Mercado PA-C    cetirizine (ZYRTEC) 10 MG tablet Take 1 tablet (10 mg total) by mouth once daily. 30 tablet 1/5/2023 1/5/2024 Jessica Mercado PA-C    fluticasone propionate (FLONASE) 50 mcg/actuation nasal spray 1 spray (50 mcg total) by Each Nostril route once daily. 9.9 mL 1/5/2023 -- Jessica Mercado PA-C    LORazepam (ATIVAN) 1 MG tablet Take 0.5 tablets (0.5 mg total) by mouth every 6 (six) hours as needed for Anxiety. 5 tablet 1/5/2023 2/4/2023 Jessica Mercado PA-C          Follow-up Information       Follow up With Specialties Details Why Contact Info    Evert Pearson MD Neonatology  For follow-up [address]  [facility] [id number] [phone]      [facility] [place] Emergency Dept Emergency Medicine  If symptoms worsen [address] [facility] [facility] [address] [phone]             Jessica Mercado PA-C  01/05/23 1257

## 2023-01-05 NOTE — DISCHARGE INSTRUCTIONS
You need to rest, my dear! Please take a few days to rest. Increase how much water you're drinking. I have sent in some prescriptions that will help with cough, congestion, and chest tightness. You may take 0.5mg Ativan to help you sleep.

## 2023-01-05 NOTE — FIRST PROVIDER EVALUATION
Emergency Department TeleTriage Encounter Note      CHIEF COMPLAINT    Chief Complaint   Patient presents with    Shortness of Breath     Pt states she feels that she cannot breath since last night which started when she was coughing. Lungs are clear to auscultation at triage. Pt is tearful and states she feels like she is having an anxiety attack. Pt is hyperventilatory but redirectable to take slow breaths.       VITAL SIGNS   Initial Vitals [01/05/23 0905]   BP Pulse Resp Temp SpO2   120/83 98 (!) 25 97.8 °F (36.6 °C) 100 %      MAP       --            ALLERGIES    Review of patient's allergies indicates:   Allergen Reactions    Peanut        PROVIDER TRIAGE NOTE  Patient complains of shortness of breath since last night. She has had a URI recently. She states she has never had shortness of breath before. She tearful in triage.       ORDERS  Labs Reviewed - No data to display    ED Orders (720h ago, onward)      None              Virtual Visit Note: The provider triage portion of this emergency department evaluation and documentation was performed via [employer]Â®, a HIPAA-compliant telemedicine application, in concert with a tele-presenter in the room. A face to face patient evaluation with one of my colleagues will occur once the patient is placed in an emergency department room.      DISCLAIMER: This note was prepared with [employer] voice recognition transcription software. Garbled syntax, mangled pronouns, and other bizarre constructions may be attributed to that software system.

## 2023-07-05 ENCOUNTER — PATIENT MESSAGE (OUTPATIENT)
Dept: RESEARCH | Facility: HOSPITAL | Age: 23
End: 2023-07-05
Payer: MEDICAID

## 2023-11-19 ENCOUNTER — HOSPITAL ENCOUNTER (EMERGENCY)
Facility: HOSPITAL | Age: 23
Discharge: HOME | End: 2023-11-19
Attending: EMERGENCY MEDICINE
Payer: COMMERCIAL

## 2023-11-19 VITALS
WEIGHT: 173 LBS | BODY MASS INDEX: 27.8 KG/M2 | RESPIRATION RATE: 18 BRPM | HEIGHT: 66 IN | OXYGEN SATURATION: 98 % | DIASTOLIC BLOOD PRESSURE: 74 MMHG | SYSTOLIC BLOOD PRESSURE: 112 MMHG | HEART RATE: 76 BPM | TEMPERATURE: 99 F

## 2023-11-19 DIAGNOSIS — Z3A.12 12 WEEKS GESTATION OF PREGNANCY: Primary | ICD-10-CM

## 2023-11-19 DIAGNOSIS — R10.30 LOWER ABDOMINAL PAIN: ICD-10-CM

## 2023-11-19 LAB
B-HCG UR QL: POSITIVE
BILIRUB UR QL STRIP: NEGATIVE
CLARITY UR: CLEAR
COLOR UR: YELLOW
CTP QC/QA: YES
GLUCOSE UR QL STRIP: NEGATIVE
HGB UR QL STRIP: NEGATIVE
KETONES UR QL STRIP: ABNORMAL
LEUKOCYTE ESTERASE UR QL STRIP: NEGATIVE
NITRITE UR QL STRIP: NEGATIVE
PH UR STRIP: 6 [PH] (ref 5–8)
PROT UR QL STRIP: ABNORMAL
SP GR UR STRIP: 1.02 (ref 1–1.03)
URN SPEC COLLECT METH UR: ABNORMAL
UROBILINOGEN UR STRIP-ACNC: NEGATIVE EU/DL

## 2023-11-19 PROCEDURE — 81025 URINE PREGNANCY TEST: CPT

## 2023-11-19 PROCEDURE — 81003 URINALYSIS AUTO W/O SCOPE: CPT

## 2023-11-19 PROCEDURE — 99284 EMERGENCY DEPT VISIT MOD MDM: CPT | Mod: 25

## 2023-11-19 RX ORDER — ACETAMINOPHEN 500 MG
500 TABLET ORAL EVERY 6 HOURS PRN
Qty: 30 TABLET | Refills: 0 | Status: SHIPPED | OUTPATIENT
Start: 2023-11-19 | End: 2024-04-01

## 2023-11-19 NOTE — ED PROVIDER NOTES
Encounter Date: 2023    SCRIBE #1 NOTE: I, Canelo Jean, am scribing for, and in the presence of,  Alayna Holdsworth, PA. I have scribed the following portions of the note - Other sections scribed: HPI, ROS.       History     Chief Complaint   Patient presents with    Abdominal Pain     Pt reports lower abdominal cramping. No vaginal bleeding . Currently 12 weeks pregnant. Hx of miscarriage at 6 weeks.      CC: Abdominal pain    HPI: Cole Jackson is a 22 y.o. female who presents to the ED for evaluation of constant lower abdominal aching/throbbing pain onset yesterday. Pt complains of associated right low back pain, nausea, and vomiting. Pt rates pain as 7/10. Pt states she vomited twice since yesterday. Pt reports she is 12 weeks pregnant and confirmed pregnancy with US in [place] at her OBGYN. Currently here for holidays. History of miscarriage at 6 weeks. A1. Pt denies recent traumas or injuries. Pt denies any aggravating/alleviating factors. Pt denies taking any medications for their symptoms. Pt denies fever, vaginal bleeding, urinary symptoms, diarrhea, or any other associated symptoms. Pt denies any known allergies.     The history is provided by the patient. No  was used.     Review of patient's allergies indicates:   Allergen Reactions    Peanut      History reviewed. No pertinent past medical history.  Past Surgical History:   Procedure Laterality Date    TONSILLECTOMY       History reviewed. No pertinent family history.  Social History     Tobacco Use    Smoking status: Never    Smokeless tobacco: Never   Substance Use Topics    Alcohol use: No    Drug use: Never     Review of Systems   Constitutional:  Negative for chills, diaphoresis and fever.   Respiratory:  Negative for shortness of breath.    Cardiovascular:  Negative for chest pain.   Gastrointestinal:  Positive for abdominal pain (lower), nausea and vomiting (x2). Negative for diarrhea.   Genitourinary:   Negative for decreased urine volume, difficulty urinating, dysuria, frequency, hematuria, urgency, vaginal bleeding, vaginal discharge and vaginal pain.   Musculoskeletal:  Positive for back pain (lower). Negative for myalgias, neck pain and neck stiffness.   Skin:  Negative for rash.   Neurological:  Negative for dizziness, weakness, light-headedness and headaches.       Physical Exam     Initial Vitals [11/19/23 1117]   BP Pulse Resp Temp SpO2   129/75 81 16 98 °F (36.7 °C) 100 %      MAP       --         Physical Exam    Nursing note and vitals reviewed.  Constitutional: Vital signs are normal. She appears well-developed and well-nourished. She is not diaphoretic. She is active. She does not appear ill. No distress.   HENT:   Head: Normocephalic and atraumatic.   Right Ear: External ear normal.   Left Ear: External ear normal.   Nose: Nose normal.   Eyes: Conjunctivae and EOM are normal. Pupils are equal, round, and reactive to light. Right eye exhibits no discharge. Left eye exhibits no discharge.   Neck:   Normal range of motion.  Cardiovascular:  Normal rate and regular rhythm.           Pulmonary/Chest: Effort normal and breath sounds normal. No respiratory distress.   Abdominal: Abdomen is soft. She exhibits no distension. There is abdominal tenderness in the suprapubic area. There is no rebound and no guarding.   Musculoskeletal:         General: Normal range of motion.      Cervical back: Normal range of motion.     Neurological: She is alert and oriented to person, place, and time. She has normal strength. GCS eye subscore is 4. GCS verbal subscore is 5. GCS motor subscore is 6.   Skin: Skin is dry. Capillary refill takes less than 2 seconds.         ED Course   Procedures  Labs Reviewed   URINALYSIS, REFLEX TO URINE CULTURE - Abnormal; Notable for the following components:       Result Value    Protein, UA Trace (*)     Ketones, UA Trace (*)     All other components within normal limits    Narrative:      Specimen Source->Urine   POCT URINE PREGNANCY - Abnormal; Notable for the following components:    POC Preg Test, Ur Positive (*)     All other components within normal limits          Imaging Results              US OB <14 Wks, TransAbd, Single Gestation (Final result)  Result time 11/19/23 13:33:22   Procedure changed from US OB <14 Wks TransAbd & TransVag, Single Gestation (XPD)     Final result by Jeronimo Mohr MD (11/19/23 13:33:22)                   Impression:      1. Single live intrauterine pregnancy with estimated gestational age 12 weeks 3 days.  2. Low lying placenta, not unusual at this gestational age.  Obstetrical consultation and follow-up ultrasound advised.      Electronically signed by: Jeronimo Mohr MD  Date:    11/19/2023  Time:    13:33               Narrative:    EXAMINATION:  US OB <14 WEEKS TRANSABDOM, SINGLE GESTATION    CLINICAL HISTORY:  lower abdominal pain with hx of miscarriage;    TECHNIQUE:  Transabdominal sonography of the pelvis was performed, followed by transvaginal sonography to better evaluate the uterus and ovaries.    COMPARISON:  None.    FINDINGS:  Intrauterine gestation(s): Single    Mean gestational sac diameter: 7.0 cm    Yolk sac: Absent    Crown-rump length (CRL): 5.9 cm    Cardiac activity: 155 bpm    Subchorionic hemorrhage: None.    Right ovary: Normal.    Left ovary: Not visualized.    Miscellaneous: No free fluid.  Low lying placenta.                                       Medications - No data to display  Medical Decision Making  22 y.o. female who presents to the ED for evaluation of constant lower abdominal aching/throbbing pain onset yesterday.   Patient's chart and medical history reviewed.    Ddx:  UTI  Gonorrhea  Chlamydia  Trichomonas  Vaginal yeast infection  Bacterial vaginosis  PID  Pregnancy  Ectopic pregnancy  Ovarian torsion    Patient's vitals reviewed.  Afebrile, no respiratory distress, and nontoxic-appearing in the ED. patient had suprapubic  tenderness to palpation.  Patient denied pain medication.  UPT was positive.  UA unremarkable. US showed single live intrauterine pregnancy with estimated gestational age 12 weeks 3 days. Low lying placenta, not unusual at this gestational age.  Obstetrical consultation and follow-up ultrasound advised.  Patient will follow-up with her OBGYN once she gets back home.  Patient will be sent home on Tylenol as needed for pain. Patient agrees with this plan. Discussed with her strict return precautions, she verbalized understanding. Patient is stable for discharge.       Amount and/or Complexity of Data Reviewed  External Data Reviewed: labs and notes.  Labs: ordered.  Radiology: ordered.    Risk  OTC drugs.            Scribe Attestation:   Scribe #1: I performed the above scribed service and the documentation accurately describes the services I performed. I attest to the accuracy of the note.              Scribe attestation: I, Alayna Holdsworth,PA-C, personally performed the services described in this documentation.  All medical record entries made by the scribe were at my direction and in my presence.  I have reviewed the chart and agree that the record reflects my personal performance and is accurate and complete.                Clinical Impression:  Final diagnoses:  [R10.30] Lower abdominal pain  [Z3A.12] 12 weeks gestation of pregnancy (Primary)          ED Disposition Condition    Discharge Stable          ED Prescriptions       Medication Sig Dispense Start Date End Date Auth. Provider    acetaminophen (TYLENOL) 500 MG tablet Take 1 tablet (500 mg total) by mouth every 6 (six) hours as needed for Pain. 30 tablet 11/19/2023 -- Holdsworth, Alayna, PA-C          Follow-up Information       Follow up With Specialties Details Why Contact Info    Your OBGYN  Schedule an appointment as soon as possible for a visit                Holdsworth, Alayna, PA-C  11/19/23 3766

## 2023-11-19 NOTE — DISCHARGE INSTRUCTIONS

## 2023-12-16 ENCOUNTER — OFFICE VISIT (OUTPATIENT)
Dept: URGENT CARE | Facility: CLINIC | Age: 23
End: 2023-12-16
Payer: COMMERCIAL

## 2023-12-16 VITALS
OXYGEN SATURATION: 100 % | SYSTOLIC BLOOD PRESSURE: 110 MMHG | BODY MASS INDEX: 27.8 KG/M2 | RESPIRATION RATE: 18 BRPM | TEMPERATURE: 98 F | DIASTOLIC BLOOD PRESSURE: 69 MMHG | HEART RATE: 79 BPM | WEIGHT: 173 LBS | HEIGHT: 66 IN

## 2023-12-16 DIAGNOSIS — R35.0 URINARY FREQUENCY: Primary | ICD-10-CM

## 2023-12-16 LAB
BILIRUB UR QL STRIP: NEGATIVE
GLUCOSE UR QL STRIP: NEGATIVE
KETONES UR QL STRIP: NEGATIVE
LEUKOCYTE ESTERASE UR QL STRIP: NEGATIVE
PH, POC UA: 6 (ref 5–8)
POC BLOOD, URINE: NEGATIVE
POC NITRATES, URINE: NEGATIVE
PROT UR QL STRIP: NEGATIVE
SP GR UR STRIP: 1.02 (ref 1–1.03)
UROBILINOGEN UR STRIP-ACNC: NORMAL (ref 0.1–1.1)

## 2023-12-16 PROCEDURE — 99204 OFFICE O/P NEW MOD 45 MIN: CPT | Mod: 25,S$GLB,, | Performed by: NURSE PRACTITIONER

## 2023-12-16 PROCEDURE — 81003 URINALYSIS AUTO W/O SCOPE: CPT | Mod: QW,S$GLB,, | Performed by: NURSE PRACTITIONER

## 2023-12-16 PROCEDURE — 81003 POCT URINALYSIS, DIPSTICK, AUTOMATED, W/O SCOPE: ICD-10-PCS | Mod: QW,S$GLB,, | Performed by: NURSE PRACTITIONER

## 2023-12-16 PROCEDURE — 87086 URINE CULTURE/COLONY COUNT: CPT | Performed by: NURSE PRACTITIONER

## 2023-12-16 PROCEDURE — 99204 PR OFFICE/OUTPT VISIT, NEW, LEVL IV, 45-59 MIN: ICD-10-PCS | Mod: 25,S$GLB,, | Performed by: NURSE PRACTITIONER

## 2023-12-16 NOTE — PATIENT INSTRUCTIONS
- You must understand that you have received an Urgent Care treatment only and that you may be released before all of your medical problems are known or treated.   - You, the patient, will arrange for follow up care as instructed.   - If your condition worsens or fails to improve we recommend that you receive another evaluation at the ER immediately or contact your PCP to discuss your concerns.   - You can call [phone] or [phone] to help schedule an appointment with the appropriate provider.    Continue to drink plenty of water  Follow up with your OB/GYN as instructed

## 2023-12-16 NOTE — PROGRESS NOTES
"Subjective:      Patient ID: Cole Jackson is a 23 y.o. female.    Vitals:  height is 5' 6" (1.676 m) and weight is 78.5 kg (173 lb). Her temperature is 98 °F (36.7 °C). Her blood pressure is 110/69 and her pulse is 79. Her respiration is 18 and oxygen saturation is 100%.     Chief Complaint: Urinary Frequency    Pt is a 22 yo female w/ c/o vaginal irritation from frequent urination, she is 15 weeks pregnant. Pt spoke with her OB GYN already in [place]. Pt's OB GYN instructed her to continue drinking water and if it did not subside to come in and get a urine culture done. She has an ultrasound scheduled for this upcoming week.     Dysuria   This is a new problem. The problem occurs every urination. The problem has been unchanged. The patient is experiencing no pain. There has been no fever. She is Sexually active. There is No history of pyelonephritis. Associated symptoms include frequency. Pertinent negatives include no behavior changes, chills, discharge, flank pain, hematuria, hesitancy, nausea, possible pregnancy, sweats, urgency, vomiting, weight loss, bubble bath use, constipation, rash or withholding. Associated symptoms comments: Vagina feels raw . She has tried nothing (water, and coconut oil) for the symptoms. The treatment provided mild relief. There is no history of catheterization or diabetes insipidus.     Constitution: Negative for chills.   Gastrointestinal:  Negative for nausea, vomiting and constipation.   Genitourinary:  Positive for dysuria and frequency. Negative for urgency, flank pain and hematuria.   Skin:  Negative for rash.      Objective:     Physical Exam   Constitutional: She is oriented to person, place, and time.   HENT:   Head: Normocephalic.   Ears:   Right Ear: External ear normal.   Left Ear: External ear normal.   Nose: Nose normal.   Mouth/Throat: Mucous membranes are moist.   Eyes: Conjunctivae are normal.   Cardiovascular: Normal rate.   Pulmonary/Chest: Effort normal. "   Abdominal: There is no abdominal tenderness. There is no left CVA tenderness and no right CVA tenderness.   Musculoskeletal: Normal range of motion.         General: Normal range of motion.   Neurological: She is alert and oriented to person, place, and time.   Skin: Skin is dry.   Psychiatric: Her behavior is normal.     Results for orders placed or performed in visit on 12/16/23   POCT Urinalysis, Dipstick, Automated, W/O Scope   Result Value Ref Range    POC Blood, Urine Negative Negative, Positive Slide, Positive Tube    POC Bilirubin, Urine Negative Negative, Positive Slide, Positive Tube    POC Urobilinogen, Urine norm 0.1 - 1.1    POC Ketones, Urine Negative Negative, Positive Slide, Positive Tube    POC Protein, Urine Negative Negative, Positive Slide, Positive Tube    POC Nitrates, Urine Negative Negative, Positive Slide, Positive Tube    POC Glucose, Urine Negative Negative, Positive Slide, Positive Tube    pH, UA 6 5 - 8    POC Specific Gravity, Urine 1.020 1.003 - 1.029    POC Leukocytes, Urine Negative Negative, Positive Slide, Positive Tube     Assessment:     1. Urinary frequency        Plan:       Urinary frequency  -     POCT Urinalysis, Dipstick, Automated, W/O Scope  -     Urine culture      Patient Instructions   - You must understand that you have received an Urgent Care treatment only and that you may be released before all of your medical problems are known or treated.   - You, the patient, will arrange for follow up care as instructed.   - If your condition worsens or fails to improve we recommend that you receive another evaluation at the ER immediately or contact your PCP to discuss your concerns.   - You can call [phone] or [phone] to help schedule an appointment with the appropriate provider.    Continue to drink plenty of water  Follow up with your OB/GYN as instructed

## 2023-12-18 LAB — BACTERIA UR CULT: NORMAL

## 2023-12-19 ENCOUNTER — TELEPHONE (OUTPATIENT)
Dept: URGENT CARE | Facility: CLINIC | Age: 23
End: 2023-12-19
Payer: COMMERCIAL

## 2023-12-19 NOTE — TELEPHONE ENCOUNTER
Informed patient that her urine culture is NEG.  She states she is still having pain in the vaginal area especially when she wipes.  I suggested she call her GYN provider for an exam.  She has been talking with her GYN, they are the one that suggested she get the urine culture in Urgent Care.  Pt just had a baby 4 months ago.  Pt verbalized understanding and will call her GYN nurse now.

## 2023-12-20 ENCOUNTER — HOSPITAL ENCOUNTER (EMERGENCY)
Facility: HOSPITAL | Age: 23
Discharge: HOME | End: 2023-12-20
Attending: EMERGENCY MEDICINE
Payer: COMMERCIAL

## 2023-12-20 VITALS
BODY MASS INDEX: 28.57 KG/M2 | TEMPERATURE: 98 F | DIASTOLIC BLOOD PRESSURE: 66 MMHG | HEART RATE: 80 BPM | SYSTOLIC BLOOD PRESSURE: 110 MMHG | WEIGHT: 177 LBS | OXYGEN SATURATION: 99 % | RESPIRATION RATE: 17 BRPM

## 2023-12-20 DIAGNOSIS — N89.8 VAGINAL DISCHARGE: Primary | ICD-10-CM

## 2023-12-20 DIAGNOSIS — O23.599 BACTERIAL VAGINOSIS IN PREGNANCY: ICD-10-CM

## 2023-12-20 DIAGNOSIS — B96.89 BACTERIAL VAGINOSIS IN PREGNANCY: ICD-10-CM

## 2023-12-20 LAB
BACTERIA GENITAL QL WET PREP: ABNORMAL
BILIRUB UR QL STRIP: NEGATIVE
CLARITY UR: ABNORMAL
CLUE CELLS VAG QL WET PREP: ABNORMAL
COLOR UR: YELLOW
FILAMENT FUNGI VAG WET PREP-#/AREA: ABNORMAL
GLUCOSE UR QL STRIP: NEGATIVE
HGB UR QL STRIP: NEGATIVE
KETONES UR QL STRIP: NEGATIVE
LEUKOCYTE ESTERASE UR QL STRIP: NEGATIVE
NITRITE UR QL STRIP: NEGATIVE
PH UR STRIP: 8 [PH] (ref 5–8)
PROT UR QL STRIP: NEGATIVE
SP GR UR STRIP: 1.01 (ref 1–1.03)
SPECIMEN SOURCE: ABNORMAL
T VAGINALIS GENITAL QL WET PREP: ABNORMAL
URN SPEC COLLECT METH UR: ABNORMAL
UROBILINOGEN UR STRIP-ACNC: NEGATIVE EU/DL
WBC #/AREA VAG WET PREP: ABNORMAL
YEAST GENITAL QL WET PREP: ABNORMAL

## 2023-12-20 PROCEDURE — 96372 THER/PROPH/DIAG INJ SC/IM: CPT | Performed by: NURSE PRACTITIONER

## 2023-12-20 PROCEDURE — 99284 EMERGENCY DEPT VISIT MOD MDM: CPT

## 2023-12-20 PROCEDURE — 87491 CHLMYD TRACH DNA AMP PROBE: CPT | Performed by: NURSE PRACTITIONER

## 2023-12-20 PROCEDURE — 87210 SMEAR WET MOUNT SALINE/INK: CPT | Performed by: NURSE PRACTITIONER

## 2023-12-20 PROCEDURE — 81003 URINALYSIS AUTO W/O SCOPE: CPT | Performed by: NURSE PRACTITIONER

## 2023-12-20 PROCEDURE — 63600175 PHARM REV CODE 636 W HCPCS: Performed by: NURSE PRACTITIONER

## 2023-12-20 RX ORDER — ASPIRIN 81 MG/1
162 TABLET ORAL DAILY
COMMUNITY
Start: 2023-11-13

## 2023-12-20 RX ORDER — CEFTRIAXONE 500 MG/1
500 INJECTION, POWDER, FOR SOLUTION INTRAMUSCULAR; INTRAVENOUS
Status: COMPLETED | OUTPATIENT
Start: 2023-12-20 | End: 2023-12-20

## 2023-12-20 RX ORDER — METRONIDAZOLE 500 MG/1
500 TABLET ORAL EVERY 12 HOURS
Qty: 14 TABLET | Refills: 0 | Status: SHIPPED | OUTPATIENT
Start: 2023-12-20 | End: 2023-12-27

## 2023-12-20 RX ADMIN — CEFTRIAXONE SODIUM 500 MG: 500 INJECTION, POWDER, FOR SOLUTION INTRAMUSCULAR; INTRAVENOUS at 10:12

## 2023-12-20 NOTE — ED PROVIDER NOTES
Encounter Date: 12/20/2023    SCRIBE #1 NOTE: ISavana am scribing for, and in the presence of,  AMBER Coffman. I have scribed the following portions of the note - Other sections scribed: HPI, ROS.       History     Chief Complaint   Patient presents with    Exposure to STD     Pt reports to ED for STD check sent from OBGYN. Pt reports been seen at  and urinalysis was clean for growth but pt reports burning and vaginal redness. Denies discharge. Pt is 16 weeks pregnant and reports abdominal cramping for a few weeks.      This 16 weeks pregnant 23 y.o female, with no medical history, presents to the ED c/o vaginal redness and irritation. Pt reports that the vaginal area is sensitive when wiping. She denies any itching or scratching of the area. She also notes experiencing urinary frequency and lower abdominal cramping. She states that she was recently seen at an urgent care facility for the symptoms and had a urinalysis preformed with no growth. Pt reports that the symptoms worsened this morning prompting her to come into the ED. Of note, pt is visiting the area from [place]. No use of new soaps or detergents.Pt denies abnormal vaginal discharge, vaginal bleeding or any other associated symptoms.     The history is provided by the patient.     Review of patient's allergies indicates:   Allergen Reactions    Peanut Itching and Rash     Only when eaten - itching throat and eyes, but subsides with benadryl; epipen     History reviewed. No pertinent past medical history.  Past Surgical History:   Procedure Laterality Date    TONSILLECTOMY       History reviewed. No pertinent family history.  Social History     Tobacco Use    Smoking status: Never     Passive exposure: Never    Smokeless tobacco: Never   Substance Use Topics    Alcohol use: No    Drug use: Never     Review of Systems   Constitutional:  Negative for chills and fever.   HENT:  Negative for sore throat.    Respiratory:  Negative for  cough, chest tightness, shortness of breath and stridor.    Cardiovascular:  Negative for chest pain.   Gastrointestinal:  Positive for abdominal pain (lower; cramping). Negative for nausea and vomiting.   Genitourinary:  Positive for frequency. Negative for dysuria, vaginal bleeding and vaginal discharge.        (+) vaginal redness, irritation and sensitivity   Musculoskeletal:  Negative for back pain and myalgias.   Skin:  Negative for rash.   Neurological:  Negative for weakness.   Hematological:  Does not bruise/bleed easily.   All other systems reviewed and are negative.      Physical Exam     Initial Vitals [12/20/23 0837]   BP Pulse Resp Temp SpO2   109/62 86 16 98.1 °F (36.7 °C) 100 %      MAP       --         Physical Exam    Nursing note and vitals reviewed.  Constitutional: She appears well-developed and well-nourished.   HENT:   Head: Normocephalic and atraumatic.   Eyes: Conjunctivae and EOM are normal. Pupils are equal, round, and reactive to light.   Neck:   Normal range of motion.  Cardiovascular:  Normal rate, regular rhythm, normal heart sounds and intact distal pulses.     Exam reveals no gallop and no friction rub.       No murmur heard.  Pulmonary/Chest: Breath sounds normal. No respiratory distress. She has no wheezes. She has no rhonchi. She has no rales. She exhibits no tenderness.   Abdominal: Abdomen is soft. Bowel sounds are normal. She exhibits no distension and no mass. There is no abdominal tenderness.   Gravid abdomen consistent with 16 week pregnancy There is no rebound and no guarding.   Genitourinary:    Vaginal discharge present.      Genitourinary Comments: White foul-smelling vaginal discharge noted; no vaginal bleeding     Musculoskeletal:         General: No tenderness or edema. Normal range of motion.      Cervical back: Normal range of motion.     Neurological: She is alert and oriented to person, place, and time. She has normal strength. GCS score is 15. GCS eye subscore is  4. GCS verbal subscore is 5. GCS motor subscore is 6.   Skin: Skin is warm. Capillary refill takes less than 2 seconds. No rash noted. No erythema.   Psychiatric: She has a normal mood and affect.       ED Course   Procedures  Labs Reviewed   VAGINAL SCREEN - Abnormal; Notable for the following components:       Result Value    Clue Cells Occasional (*)     WBC - Vaginal Screen Rare (*)     Bacteria - Vaginal Screen Moderate (*)     All other components within normal limits    Narrative:     Release to patient->Immediate   URINALYSIS, REFLEX TO URINE CULTURE - Abnormal; Notable for the following components:    Appearance, UA Hazy (*)     All other components within normal limits    Narrative:     Specimen Source->Urine   C. TRACHOMATIS/N. GONORRHOEAE BY AMP DNA          Imaging Results    None          Medications   cefTRIAXone injection 500 mg (500 mg Intramuscular Given 12/20/23 1052)     Medical Decision Making  23-year-old female which presents to the emergency room with vaginal redness and irritation.  Vaginal discharge noted on exam.  Patient with clue cells and moderate amount of bacteria on wet prep.  Patient given a Rocephin injection while in the ED and has been discharged with Flagyl.  Patient advised that we will call her with her gonorrhea/chlamydia results.  Patient advised to follow up with her OB as needed. Patient given strict return precautions and voiced understanding of all discharge instructions. Pt was stable at discharge.       Differential diagnosis:  Gonorrhea, chlamydia, bacterial vaginosis, Trichomonas, yeast infection    Problems Addressed:  Bacterial vaginosis in pregnancy: acute illness or injury  Vaginal discharge: acute illness or injury    Amount and/or Complexity of Data Reviewed  Labs: ordered. Decision-making details documented in ED Course.    Risk  Prescription drug management.            Scribe Attestation:   Scribe #1: I performed the above scribed service and the  documentation accurately describes the services I performed. I attest to the accuracy of the note.        ED Course as of 12/20/23 1334   Wed Dec 20, 2023   0853 BP: 109/62 [AT]   0853 Temp: 98.1 °F (36.7 °C) [AT]   0853 Pulse: 86 [AT]   0853 Resp: 16 [AT]   0853 SpO2: 100 % [AT]   0956 Appearance, UA(!): Hazy [AT]   1032 Clue Cells, Wet Prep(!): Occasional [AT]   1032 Bacteria - Vaginal Screen(!): Moderate [AT]      ED Course User Index  [AT] Stephanie Capellan FNP                     IStephanie FNP, personally performed the services described in this documentation. All medical record entries made by the scribe were at my direction and in my presence. I have reviewed the chart and agree that the record reflects my personal performance and is accurate and complete.       Clinical Impression:  Final diagnoses:  [N89.8] Vaginal discharge (Primary)  [O23.599, B96.89] Bacterial vaginosis in pregnancy          ED Disposition Condition    Discharge Stable          ED Prescriptions       Medication Sig Dispense Start Date End Date Auth. Provider    metroNIDAZOLE (FLAGYL) 500 MG tablet Take 1 tablet (500 mg total) by mouth every 12 (twelve) hours. for 7 days 14 tablet 12/20/2023 12/27/2023 Stephanie Capellan FNP          Follow-up Information       Follow up With Specialties Details Why Contact Info    OB as needed                 Stephanie Capellan FNP  12/20/23 8599

## 2023-12-21 LAB
C TRACH DNA SPEC QL NAA+PROBE: NOT DETECTED
N GONORRHOEA DNA SPEC QL NAA+PROBE: NOT DETECTED

## 2024-02-18 ENCOUNTER — HOSPITAL ENCOUNTER (EMERGENCY)
Facility: OTHER | Age: 24
Discharge: HOME | End: 2024-02-18
Attending: OBSTETRICS & GYNECOLOGY
Payer: COMMERCIAL

## 2024-02-18 VITALS
DIASTOLIC BLOOD PRESSURE: 64 MMHG | TEMPERATURE: 98 F | SYSTOLIC BLOOD PRESSURE: 105 MMHG | OXYGEN SATURATION: 100 % | HEART RATE: 83 BPM

## 2024-02-18 DIAGNOSIS — B96.89 BACTERIAL VAGINOSIS IN PREGNANCY: Primary | ICD-10-CM

## 2024-02-18 DIAGNOSIS — O23.599 BACTERIAL VAGINOSIS IN PREGNANCY: Primary | ICD-10-CM

## 2024-02-18 DIAGNOSIS — Z3A.25 25 WEEKS GESTATION OF PREGNANCY: ICD-10-CM

## 2024-02-18 LAB
GARDNERELLA VAGINALIS: POSITIVE
OTHER MICROSC. OBSERVATIONS: ABNORMAL
POC BACTERIAL VAGINOSIS: POSITIVE
POC CLUE CELLS: POSITIVE
TRICHOMONAS, POC: NEGATIVE
YEAST WET PREP: NEGATIVE

## 2024-02-18 PROCEDURE — 99283 EMERGENCY DEPT VISIT LOW MDM: CPT | Mod: 25,,, | Performed by: OBSTETRICS & GYNECOLOGY

## 2024-02-18 PROCEDURE — 87491 CHLMYD TRACH DNA AMP PROBE: CPT

## 2024-02-18 PROCEDURE — 99284 EMERGENCY DEPT VISIT MOD MDM: CPT

## 2024-02-18 PROCEDURE — 81514 NFCT DS BV&VAGINITIS DNA ALG: CPT

## 2024-02-18 PROCEDURE — 59025 FETAL NON-STRESS TEST: CPT | Mod: 26,,, | Performed by: OBSTETRICS & GYNECOLOGY

## 2024-02-18 PROCEDURE — 59025 FETAL NON-STRESS TEST: CPT

## 2024-02-19 LAB
BACTERIAL VAGINOSIS DNA: POSITIVE
C TRACH DNA SPEC QL NAA+PROBE: NOT DETECTED
CANDIDA GLABRATA DNA: NEGATIVE
CANDIDA KRUSEI DNA: NEGATIVE
CANDIDA RRNA VAG QL PROBE: NEGATIVE
N GONORRHOEA DNA SPEC QL NAA+PROBE: NOT DETECTED
T VAGINALIS RRNA GENITAL QL PROBE: NEGATIVE

## 2024-02-19 NOTE — ED PROVIDER NOTES
Encounter Date: 2024       History     Chief Complaint   Patient presents with    Mucoid vaginal discharge     Cloe Jackson is a 23 y.o. F at 25w4d presents complaining of vaginal discharge. Patient reports passing copious mucus like discharge earlier today. Denies vaginal itching or irritation. Denies leakage of fluid. States she was diagnosed with BV back in December but did not take flagyl because it causes yeast infections. She takes a daily probiotic to try to prevent BV. Denies any new sexual partners or concern for an STD.    This IUP is complicated by transfer of care (recently moved from [place]) .  Patient denies contractions, denies vaginal bleeding, denies LOF.   Fetal Movement: normal      Review of patient's allergies indicates:   Allergen Reactions    Peanut Itching and Rash     Only when eaten - itching throat and eyes, but subsides with benadryl; epipen     No past medical history on file.  Past Surgical History:   Procedure Laterality Date    TONSILLECTOMY       No family history on file.  Social History     Tobacco Use    Smoking status: Never     Passive exposure: Never    Smokeless tobacco: Never   Substance Use Topics    Alcohol use: No    Drug use: Never     Review of Systems   Constitutional:  Negative for chills and fever.   HENT:  Negative for congestion and sore throat.    Eyes:  Negative for visual disturbance.   Respiratory:  Negative for cough and shortness of breath.    Cardiovascular:  Negative for chest pain, palpitations and leg swelling.   Gastrointestinal:  Negative for abdominal pain, constipation, diarrhea, nausea and vomiting.   Genitourinary:  Positive for vaginal discharge. Negative for dysuria, frequency and vaginal bleeding.   Musculoskeletal:  Negative for back pain.   Skin:  Negative for rash.   Neurological:  Negative for dizziness and headaches.   Hematological:  Does not bruise/bleed easily.   Psychiatric/Behavioral:  The patient is not nervous/anxious.         Physical Exam     Initial Vitals [02/18/24 1930]   BP Pulse Resp Temp SpO2   120/63 88 -- 97.8 °F (36.6 °C) 100 %      MAP       --         Physical Exam    Constitutional: She appears well-developed and well-nourished.   HENT:   Head: Normocephalic and atraumatic.   Eyes: Conjunctivae are normal.   Neck:   Normal range of motion.  Cardiovascular:  Normal rate.           Pulmonary/Chest: No respiratory distress.   Abdominal: There is no abdominal tenderness.   Gravid   Genitourinary:    Vaginal discharge present.      Genitourinary Comments: Copious thin white vaginal discharge noted. The cervix was not friable.      Musculoskeletal:         General: Normal range of motion.      Cervical back: Normal range of motion.     Neurological: She is alert and oriented to person, place, and time.   Skin: Skin is warm and dry.   Psychiatric: She has a normal mood and affect.         ED Course   Fetal non-stress test    Date/Time: 2/18/2024 11:25 PM    Performed by: Tiffanie Roblero MD  Authorized by: Clarissa Jackson MD    Nonstress Test:     Variability:  6-25 BPM    Decelerations:  None    Accelerations:  10 bpm    Baseline:  145    Contractions:  Not present  Biophysical Profile:     Nonstress Test Interpretation: reactive      Overall Impression:  Reassuring    Labs Reviewed   POCT WET PREP - Abnormal; Notable for the following components:       Result Value    Gardnerella vaginalis Positive (*)     Clue Cells, POC Positive (*)     POC Bacterial Vaginosis Positive (*)     All other components within normal limits   VAGINOSIS SCREEN BY DNA PROBE   C. TRACHOMATIS/N. GONORRHOEAE BY AMP DNA          Imaging Results    None          Medications - No data to display  Medical Decision Making  Amount and/or Complexity of Data Reviewed  Labs: ordered.              Attending Attestation:   Physician Attestation Statement for Resident:  As the supervising MD   Physician Attestation Statement: I have personally seen and  examined this patient.   I agree with the above history.  -:   As the supervising MD I agree with the above PE.     As the supervising MD I agree with the above treatment, course, plan, and disposition.   -:   NST  I independently reviewed the fetal non-stress test with the following interpretation:  145 BPM baseline  Variability: moderate  Accelerations: present  Decelerations: absent  Contractions: none  Category 1    Clinical Interpretation:age appropriate    Patient evaluated and found to be stable, agree with resident's assessment and plan.  Vaginal discharge consistent with BV but pt declines treatment and risks of untreated BV discussed.  She had NOB pt appt this week as a transfer from [place].      I was personally present during the critical portions of the procedure(s) performed by the resident and was immediately available in the ED to provide services and assistance as needed during the entire procedure.  I have reviewed and agree with the residents interpretation of the following: lab data.                            Medical Decision Making:   Initial Assessment:   24 yo  at 25w3d presenting with bacterial vaginosis in pregnancy   ED Management:  1. Bacterial vaginosis in pregnancy   -VSS  -NST reactive and reassuring; toco quiet  -SSE: copious thin white vaginal discharge  -Vaginosis and GC/CT collected  -POCT wet prep: numerous clue cells  -Discussed recommendation of treatment with flagyl. Patient declines treatment because flagyl causes yeast infections. Counseled patient that untreated BV in pregnancy could lead to PTL. Advised patient that we can prescribe diflucan in addition to flagyl or we can try mretrogel instead of flagyl but she still declines treatment.   -She has first prenatal appt at [facility] on Thursday with Dr. Hui  -Return precautions given              Clinical Impression:  Final diagnoses:  [O23.599, B96.89] Bacterial vaginosis in pregnancy (Primary)  [Z3A.25] 25 weeks  gestation of pregnancy          ED Disposition Condition    Discharge Stable          ED Prescriptions    None       Follow-up Information    None         Tiffanie Robelro MD  [facility]   OBGYN PGY2       Tiffanie Roblero MD  Resident  02/19/24 0158       Clarissa Jackson MD  02/19/24 5702

## 2024-02-19 NOTE — DISCHARGE INSTRUCTIONS
Please read your discharge information thoroughly.    Call clinic during regular business hours at [phone] or Labor & Delivery Unit after hours at [phone] for vaginal bleeding, leakage of fluids, contractions every 2-5 minutes for 2 hours, decreased fetal movements (10 kicks in 2 hours), headache not relieved by Tylenol, blurry vision, or temperature of 100.4 or greater.     Begin doing fetal kick counts, at least 10 movements in 2 hours starting at 28 weeks gestation.    Keep next clinic appointment.

## 2024-02-22 ENCOUNTER — LAB VISIT (OUTPATIENT)
Dept: LAB | Facility: OTHER | Age: 24
End: 2024-02-22
Attending: OBSTETRICS & GYNECOLOGY
Payer: COMMERCIAL

## 2024-02-22 ENCOUNTER — INITIAL PRENATAL (OUTPATIENT)
Dept: OBSTETRICS AND GYNECOLOGY | Facility: CLINIC | Age: 24
End: 2024-02-22
Payer: COMMERCIAL

## 2024-02-22 VITALS
DIASTOLIC BLOOD PRESSURE: 72 MMHG | SYSTOLIC BLOOD PRESSURE: 116 MMHG | WEIGHT: 196.19 LBS | BODY MASS INDEX: 31.67 KG/M2

## 2024-02-22 DIAGNOSIS — Z34.02 ENCOUNTER FOR SUPERVISION OF NORMAL FIRST PREGNANCY IN SECOND TRIMESTER: ICD-10-CM

## 2024-02-22 DIAGNOSIS — Z34.02 ENCOUNTER FOR SUPERVISION OF NORMAL FIRST PREGNANCY IN SECOND TRIMESTER: Primary | ICD-10-CM

## 2024-02-22 LAB — GLUCOSE SERPL-MCNC: 75 MG/DL (ref 70–140)

## 2024-02-22 PROCEDURE — 82950 GLUCOSE TEST: CPT | Performed by: OBSTETRICS & GYNECOLOGY

## 2024-02-22 PROCEDURE — 0500F INITIAL PRENATAL CARE VISIT: CPT | Mod: S$GLB,,, | Performed by: OBSTETRICS & GYNECOLOGY

## 2024-02-22 PROCEDURE — 36415 COLL VENOUS BLD VENIPUNCTURE: CPT | Performed by: OBSTETRICS & GYNECOLOGY

## 2024-02-22 PROCEDURE — 99999 PR PBB SHADOW E&M-EST. PATIENT-LVL III: CPT | Mod: PBBFAC,,, | Performed by: OBSTETRICS & GYNECOLOGY

## 2024-02-22 NOTE — PROGRESS NOTES
24yo  presents for transfer of care at 25w4d.  Previously receiving PNC in [place].  No problems this pregnancy.  + Gagging, but no n/v.  Occ heartburn, relieved with tums.  Denies vb/spotting.  No cramping/abd pain.  Overall doing well this pregnancy.  Taking PNV.    Plan:  - Discussed routine OB visits.  - Glucola today.   - Declined pap today.   - Will refer for US due to late DAHIANA.   - Counseling done, all questions answered.

## 2024-02-23 ENCOUNTER — PATIENT MESSAGE (OUTPATIENT)
Dept: ADMINISTRATIVE | Facility: OTHER | Age: 24
End: 2024-02-23
Payer: COMMERCIAL

## 2024-02-25 ENCOUNTER — PATIENT MESSAGE (OUTPATIENT)
Dept: OTHER | Facility: OTHER | Age: 24
End: 2024-02-25
Payer: COMMERCIAL

## 2024-02-29 ENCOUNTER — OFFICE VISIT (OUTPATIENT)
Dept: MATERNAL FETAL MEDICINE | Facility: CLINIC | Age: 24
End: 2024-02-29
Payer: COMMERCIAL

## 2024-02-29 ENCOUNTER — PROCEDURE VISIT (OUTPATIENT)
Dept: MATERNAL FETAL MEDICINE | Facility: CLINIC | Age: 24
End: 2024-02-29
Payer: COMMERCIAL

## 2024-02-29 DIAGNOSIS — O36.8390 FETAL ARRHYTHMIA AFFECTING PREGNANCY, ANTEPARTUM: Primary | ICD-10-CM

## 2024-02-29 DIAGNOSIS — Z34.02 ENCOUNTER FOR SUPERVISION OF NORMAL FIRST PREGNANCY IN SECOND TRIMESTER: ICD-10-CM

## 2024-02-29 DIAGNOSIS — Z34.02 ENCOUNTER FOR SUPERVISION OF NORMAL FIRST PREGNANCY IN SECOND TRIMESTER: Primary | ICD-10-CM

## 2024-02-29 PROCEDURE — 76816 OB US FOLLOW-UP PER FETUS: CPT | Mod: S$GLB,,, | Performed by: OBSTETRICS & GYNECOLOGY

## 2024-02-29 PROCEDURE — 99213 OFFICE O/P EST LOW 20 MIN: CPT | Mod: S$GLB,,, | Performed by: OBSTETRICS & GYNECOLOGY

## 2024-02-29 NOTE — ASSESSMENT & PLAN NOTE
PREMATURE ATRIAL CONTRACTION (PAC)  Today the finding of premature atrial contractions (PACs) were noted on ultrasound.    PACs, along with PVCs, are the most common of fetal arrythmia accounting for over 90% of fetal arrythmias.  PACs are historically benign and most resolve by the time of delivery.  It is extremely rare for PACs to cause problems in the . However, between 1-5% of cases with PACs may progress to a more concerning fetal arrhythmia, such as supraventricular tachycardia (SVT). Therefore, serial monitoring of the fetal heart rate q 1-2 weeks initially is recommended. This may be done by in-office auscultation or via ultrasound exam. Once PACs resolve or are deemed to be infrequent and stable, the frequency of monitoring of the FHR may decrease or be discontinued. Generally, no clear etiology or precipitating factors for PACs are identified; however, cessation of maternal intake of beta-sympathomimetic substances (caffeine, theophylline), alcohol/nicotine or medications (OTC cold medicines, for example) is recommended.      Recommendations:  Routine prenatal care with HR checks at each visit.  Repeat ultrasound scheduled in 4 weeks for follow up growth and repeat assessment of fetal heart rhythm

## 2024-02-29 NOTE — PROGRESS NOTES
MATERNAL-FETAL MEDICINE   CONSULT NOTE    Provider requesting consultation: urgent add on    SUBJECTIVE:     Ms. Cole Jackson is a 23 y.o.  female with IUP at 26w4d who is seen in consultation by MFM for evaluation and management of:  Problem   Fetal Arrhythmia Affecting Pregnancy, Antepartum     Doing well without complaints. Denies excessive caffeine use.        Medication List with Changes/Refills   Current Medications    ACETAMINOPHEN (TYLENOL) 500 MG TABLET    Take 2 tablets (1,000 mg total) by mouth every 6 (six) hours as needed.    ACETAMINOPHEN (TYLENOL) 500 MG TABLET    Take 1 tablet (500 mg total) by mouth every 6 (six) hours as needed for Pain.    ALBUTEROL (PROVENTIL/VENTOLIN HFA) 90 MCG/ACTUATION INHALER    Inhale 1-2 puffs into the lungs every 6 (six) hours as needed for Wheezing. Rescue    ASPIRIN (ECOTRIN) 81 MG EC TABLET    Take 162 mg by mouth once daily.    CETIRIZINE (ZYRTEC) 10 MG TABLET    Take 1 tablet (10 mg total) by mouth once daily.    DOXYLAMINE SUCCINATE 25 MG TABLET    Take 25 mg by mouth.    FLUTICASONE PROPIONATE (FLONASE) 50 MCG/ACTUATION NASAL SPRAY    1 spray (50 mcg total) by Each Nostril route once daily.    LACTOBACILLUS ACIDOPHILUS (PROBIOTIC ORAL)    Take by mouth.    LORAZEPAM (ATIVAN) 1 MG TABLET    Take 0.5 tablets (0.5 mg total) by mouth every 6 (six) hours as needed for Anxiety.    PNV NO.95/FERROUS FUM/FOLIC AC (PRENATAL ORAL)    Take by mouth.       Review of patient's allergies indicates:   Allergen Reactions    Peanut Itching and Rash     Only when eaten - itching throat and eyes, but subsides with benadryl; epipen       PMH:No past medical history on file.    PObHx:  OB History    Para Term  AB Living   2       1     SAB IAB Ectopic Multiple Live Births                  # Outcome Date GA Lbr Braden/2nd Weight Sex Delivery Anes PTL Lv   2 Current            1 AB                PSH:  Past Surgical History:   Procedure Laterality Date    TONSILLECTOMY          Family history:family history includes No Known Problems in her brother, father, and paternal grandmother.    Social history: reports that she has never smoked. She has never been exposed to tobacco smoke. She has never used smokeless tobacco. She reports that she does not drink alcohol and does not use drugs.    Genetic history:  The patient denies any inherited genetic diseases or birth defects in herself or her partner's personal history or family.    Objective:   There were no vitals taken for this visit.    Physical Exam    Ultrasound performed. See viewpoint for full ultrasound report.  Mayo live IUP  Fetal size is appropriate for gestational age, with the EFW (962 g) plotting at the 48% and the AC plotting at the 38%.   Today the finding of premature atrial contractions (PACs) were noted on ultrasound. PACs, along with PVCs, are the most common of fetal arrhythmia, accounting for over 90% of fetal arrhythmias. PACs are generally self-limited and benign with most resolving either antenatally or shortly after birth. See EPIC consult  A limited repeat fetal anatomic survey appears normal (anatomy documented in care everywhere from OB in [place]). Targeted heart views obtained and normal appearing today  The MVP is normal.       ASSESSMENT/PLAN:     23 y.o.  female with IUP at 26w4d     Fetal arrhythmia affecting pregnancy, antepartum  PREMATURE ATRIAL CONTRACTION (PAC)  Today the finding of premature atrial contractions (PACs) were noted on ultrasound.    PACs, along with PVCs, are the most common of fetal arrythmia accounting for over 90% of fetal arrythmias.  PACs are historically benign and most resolve by the time of delivery.  It is extremely rare for PACs to cause problems in the . However, between 1-5% of cases with PACs may progress to a more concerning fetal arrhythmia, such as supraventricular tachycardia (SVT). Therefore, serial monitoring of the fetal heart rate q 1-2 weeks  initially is recommended. This may be done by in-office auscultation or via ultrasound exam. Once PACs resolve or are deemed to be infrequent and stable, the frequency of monitoring of the FHR may decrease or be discontinued. Generally, no clear etiology or precipitating factors for PACs are identified; however, cessation of maternal intake of beta-sympathomimetic substances (caffeine, theophylline), alcohol/nicotine or medications (OTC cold medicines, for example) is recommended.      Recommendations:  Routine prenatal care with HR checks at each visit.  Repeat ultrasound scheduled in 4 weeks for follow up growth and repeat assessment of fetal heart rhythm    No follow up MFM MD visit has been scheduled     Napoleon Sherwood MD  PGY 7  Maternal Fetal Medicine  [facility]      Electronically Signed by Napoleon Sherwood February 29, 2024      ATTENDING ATTESTATION  I have seen the patient and reviewed the Dr. Sherwood's consultation note, assessment and plan. I have personally spoken to and discussed plan with the patient and agree with the findings. All changes made to the body of the note.    Patient was counseled that prenatal ultrasound studies have limitations. They do not detect all fetal, genetic, placental, and maternal abnormalities.     The patient was given an opportunity to ask questions about the management of her high risk pregnancy problems. She expressed an understanding of and agreement to the above impression and plan. All questions were answered to her satisfaction.      Wilbert Goins MD   Maternal-Fetal Medicine      Electronically Signed by Wilbert Goins February 29, 2024

## 2024-03-10 ENCOUNTER — PATIENT MESSAGE (OUTPATIENT)
Dept: OTHER | Facility: OTHER | Age: 24
End: 2024-03-10
Payer: COMMERCIAL

## 2024-03-14 ENCOUNTER — ROUTINE PRENATAL (OUTPATIENT)
Dept: OBSTETRICS AND GYNECOLOGY | Facility: CLINIC | Age: 24
End: 2024-03-14
Payer: MEDICAID

## 2024-03-14 VITALS
BODY MASS INDEX: 31.92 KG/M2 | DIASTOLIC BLOOD PRESSURE: 60 MMHG | WEIGHT: 197.75 LBS | SYSTOLIC BLOOD PRESSURE: 114 MMHG

## 2024-03-14 DIAGNOSIS — Z3A.28 28 WEEKS GESTATION OF PREGNANCY: Primary | ICD-10-CM

## 2024-03-14 PROCEDURE — 99999 PR PBB SHADOW E&M-EST. PATIENT-LVL II: CPT | Mod: PBBFAC,,,

## 2024-03-14 PROCEDURE — 99212 OFFICE O/P EST SF 10 MIN: CPT | Mod: PBBFAC

## 2024-03-14 PROCEDURE — 99213 OFFICE O/P EST LOW 20 MIN: CPT | Mod: TH,S$PBB,,

## 2024-03-14 NOTE — PROGRESS NOTES
Here for routine OB appt at 28w4d, with no complaints.  Reports good FM.  Denies LOF, denies VB, denies contractions. No PACs heard with doppler today. TDAP today. THinking about pediatrician. Going to do parenting classes.  Reviewed warning signs of Labor and Preeclampsia.  Daily FM counts reinforced.  F/U scheduled 2 weeks with Dr. Hui     I spent a total of 20 minutes on the day of the visit.This includes face to face time and non-face to face time preparing to see the patient (eg, review of tests), Obtaining and/or reviewing separately obtained history, Documenting clinical information in the electronic or other health record, Independently interpreting resultsand communicating results to the patient/family/caregiver, or Care coordination

## 2024-03-14 NOTE — PATIENT INSTRUCTIONS
Call clinic [phone] or L & D after hours at [phone] for vaginal bleeding, leakage of fluids, contractions 4-5 in one hour, decreased fetal movements ( 10 kicks in 2 hours), headache not relieved by Tylenol, blurry vision, or temp of 100.4 or greater.  Begin doing fetal kick counts, at least 10 movements in 2 hours starting at 28 weeks gestation.  Keep next clinic appointment

## 2024-03-23 ENCOUNTER — HOSPITAL ENCOUNTER (EMERGENCY)
Facility: OTHER | Age: 24
Discharge: HOME | End: 2024-03-23
Attending: OBSTETRICS & GYNECOLOGY
Payer: MEDICAID

## 2024-03-23 VITALS
TEMPERATURE: 98 F | HEART RATE: 81 BPM | RESPIRATION RATE: 17 BRPM | SYSTOLIC BLOOD PRESSURE: 109 MMHG | DIASTOLIC BLOOD PRESSURE: 62 MMHG | OXYGEN SATURATION: 100 %

## 2024-03-23 DIAGNOSIS — L73.9 FOLLICULITIS: Primary | ICD-10-CM

## 2024-03-23 DIAGNOSIS — L02.91 ABSCESS: ICD-10-CM

## 2024-03-23 DIAGNOSIS — Z3A.29 29 WEEKS GESTATION OF PREGNANCY: ICD-10-CM

## 2024-03-23 PROCEDURE — 99284 EMERGENCY DEPT VISIT MOD MDM: CPT | Mod: 25,,, | Performed by: OBSTETRICS & GYNECOLOGY

## 2024-03-23 PROCEDURE — 99284 EMERGENCY DEPT VISIT MOD MDM: CPT | Mod: 25

## 2024-03-23 PROCEDURE — 59025 FETAL NON-STRESS TEST: CPT | Mod: 26,59,, | Performed by: OBSTETRICS & GYNECOLOGY

## 2024-03-23 PROCEDURE — 10060 I&D ABSCESS SIMPLE/SINGLE: CPT | Mod: ,,, | Performed by: OBSTETRICS & GYNECOLOGY

## 2024-03-23 PROCEDURE — 59025 FETAL NON-STRESS TEST: CPT

## 2024-03-23 PROCEDURE — 87075 CULTR BACTERIA EXCEPT BLOOD: CPT

## 2024-03-23 PROCEDURE — 87076 CULTURE ANAEROBE IDENT EACH: CPT

## 2024-03-23 PROCEDURE — 87070 CULTURE OTHR SPECIMN AEROBIC: CPT

## 2024-03-23 PROCEDURE — 10060 I&D ABSCESS SIMPLE/SINGLE: CPT

## 2024-03-23 PROCEDURE — 25000003 PHARM REV CODE 250

## 2024-03-23 RX ORDER — LIDOCAINE HYDROCHLORIDE 10 MG/ML
3 INJECTION INFILTRATION; PERINEURAL ONCE
Status: COMPLETED | OUTPATIENT
Start: 2024-03-23 | End: 2024-03-23

## 2024-03-23 RX ORDER — CEPHALEXIN 500 MG/1
500 CAPSULE ORAL 4 TIMES DAILY
Qty: 20 CAPSULE | Refills: 0 | Status: SHIPPED | OUTPATIENT
Start: 2024-03-23 | End: 2024-03-28

## 2024-03-23 RX ADMIN — LIDOCAINE HYDROCHLORIDE 3 ML: 10 INJECTION, SOLUTION INFILTRATION; PERINEURAL at 08:03

## 2024-03-24 ENCOUNTER — PATIENT MESSAGE (OUTPATIENT)
Dept: OTHER | Facility: OTHER | Age: 24
End: 2024-03-24
Payer: MEDICAID

## 2024-03-24 NOTE — ED PROVIDER NOTES
"Encounter Date: 3/23/2024       History     Chief Complaint   Patient presents with    Recurrent Skin Infections     Nato Jackson is a 23 y.o. [id number] at 29w6d presents complaining of small "boil" on the left inner thigh. She reports it hurts a little and is bothersome due to chaffing. She has had these appear before on her inner thigh, but never in the exact same place.  This IUP is overall  complicated. Patient denies contractions, denies vaginal bleeding, denies LOF.   Fetal Movement: normal        Review of patient's allergies indicates:   Allergen Reactions    Peanut Itching and Rash     Only when eaten - itching throat and eyes, but subsides with benadryl; epipen     No past medical history on file.  Past Surgical History:   Procedure Laterality Date    TONSILLECTOMY       Family History   Problem Relation Age of Onset    No Known Problems Paternal Grandmother     No Known Problems Father     No Known Problems Brother     Ovarian cancer Neg Hx     Diabetes Neg Hx      Social History     Tobacco Use    Smoking status: Never     Passive exposure: Never    Smokeless tobacco: Never   Substance Use Topics    Alcohol use: No    Drug use: Never     Review of Systems   Constitutional:  Negative for chills and fever.   HENT:  Negative for congestion.    Eyes:  Negative for visual disturbance.   Respiratory:  Negative for shortness of breath.    Cardiovascular:  Negative for chest pain and palpitations.   Gastrointestinal:  Negative for constipation, diarrhea, nausea and vomiting.   Genitourinary:  Negative for dysuria, vaginal bleeding and vaginal discharge.   Musculoskeletal:  Negative for back pain.   Skin:  Negative for rash.   Neurological:  Negative for light-headedness and headaches.       Physical Exam     Initial Vitals [24 1915]   BP Pulse Resp Temp SpO2   109/62 81 17 98.4 °F (36.9 °C) 100 %      MAP       --         Physical Exam    Vitals reviewed.  Constitutional: She appears well-developed " and well-nourished. She is not diaphoretic. No distress.   HENT:   Head: Normocephalic and atraumatic.   Eyes: Conjunctivae are normal.   Cardiovascular:  Normal rate.           Pulmonary/Chest: No respiratory distress.   Abdominal: There is no abdominal tenderness. There is no rebound and no guarding.     Neurological: She is alert and oriented to person, place, and time.   Skin: Skin is warm and dry. Abscess (Small 1 cm area of induration. No erythema. Small area of fluctuance. Minimally tender to touch) noted.   Psychiatric: She has a normal mood and affect.         ED Course   Fetal non-stress test    Date/Time: 3/23/2024 7:50 PM    Performed by: Jazlyn Trejo MD  Authorized by: Ting Crawley MD    Nonstress Test:     Variability:  6-25 BPM    Decelerations:  None    Accelerations:  15 bpm    Baseline:  140    Contractions:  Not present  Biophysical Profile:     Nonstress Test Interpretation: reactive      Overall Impression:  Reassuring  Post-procedure:     Patient tolerance:  Patient tolerated the procedure well with no immediate complications  I & D - Incision and Drainage    Date/Time: 3/23/2024 8:23 PM  Location procedure was performed: [facility] EMERGENCY ROOM    Performed by: Jazlyn Trejo MD  Authorized by: Ting Crawley MD  Type: abscess  Body area: lower extremity  Location details: left leg  Anesthesia: local infiltration    Anesthesia:  Local Anesthetic: lidocaine 1% without epinephrine  Description of findings: Small 1 cm indurated area with some accompanied fluctuance. Minimally tender to palpation.   Scalpel size: 11  Incision type: single straight  Incision depth: dermal  Drainage: pus  Drainage amount: scant  Wound treatment: incision and drainage  Packing material: none  Complications: No  Estimated blood loss (mL): 0.5  Specimens: Yes  Implants: No  Comments: Culture of drainage obtained     Incision depth: dermal        Labs Reviewed   CULTURE, AEROBIC   (SPECIFY SOURCE)   CULTURE, ANAEROBIC          Imaging Results    None          Medications   LIDOcaine HCL 10 mg/ml (1%) injection 3 mL (3 mLs Other Given 3/23/24 2005)     Medical Decision Making  - VSS  - NSR RR  - [place] no contractions  - Small 1 cm abscess/ folliculitis  - I&D performed without issue  - Pus drained from site and sent for culture  - Rx for keflex 500 mg QID for 5 days sent to home pharmacy  - Recommended keeping area clean and dry with daily bandage changes  - Sees primary OB next week  - Patient stable for discharge at this time  - ED return precautions given  - She voiced understanding and is in agreement with the plan      Risk  Prescription drug management.              Attending Attestation:   Physician Attestation Statement for Resident:  As the supervising MD   Physician Attestation Statement: I have personally seen and examined this patient.   I agree with the above history.  -:   As the supervising MD I agree with the above PE.     As the supervising MD I agree with the above treatment, course, plan, and disposition.   -: NST reactive and reassuring, toco with no contractions.  ~2cm furuncle noted on the left inner thigh, non-tender, fluctuant, no erythema.  VSS and afebrile.  I&D performed without difficulty, cultures taken.  Agree with discharge to home with Rx for keflex.  Will f/u with OB in 1 week.  I was personally present during the critical portions of the procedure(s) performed by the resident and was immediately available in the ED to provide services and assistance as needed during the entire procedure.   I have reviewed the following: old records at this facility.                ED Course as of 03/23/24 2129   Sat Mar 23, 2024   2126 BP: 109/62 [CD]   2126 Temp: 98.4 °F (36.9 °C) [CD]   2126 Pulse: 81 [CD]   2126 Resp: 17 [CD]   2126 SpO2: 100 % [CD]      ED Course User Index  [CD] Ting Crawley MD                           Clinical Impression:  Final  diagnoses:  [Z3A.29] 29 weeks gestation of pregnancy  [L73.9] Folliculitis (Primary)  [L02.91] Abscess          ED Disposition Condition    Discharge Stable          ED Prescriptions       Medication Sig Dispense Start Date End Date Auth. Provider    cephALEXin (KEFLEX) 500 MG capsule Take 1 capsule (500 mg total) by mouth 4 (four) times daily. for 5 days 20 capsule 3/23/2024 3/28/2024 Jazlyn Trejo MD          Follow-up Information    None          Jazlyn Trejo MD  Resident  03/23/24 2029       Ting Crawley MD  03/23/24 2130

## 2024-03-27 LAB — BACTERIA SPEC AEROBE CULT: NO GROWTH

## 2024-03-28 ENCOUNTER — PROCEDURE VISIT (OUTPATIENT)
Dept: MATERNAL FETAL MEDICINE | Facility: CLINIC | Age: 24
End: 2024-03-28
Payer: MEDICAID

## 2024-03-28 DIAGNOSIS — Z34.02 ENCOUNTER FOR SUPERVISION OF NORMAL FIRST PREGNANCY IN SECOND TRIMESTER: ICD-10-CM

## 2024-03-28 LAB
BACTERIA SPEC ANAEROBE CULT: ABNORMAL
BACTERIA SPEC ANAEROBE CULT: ABNORMAL

## 2024-03-28 PROCEDURE — 76816 OB US FOLLOW-UP PER FETUS: CPT | Mod: PBBFAC | Performed by: OBSTETRICS & GYNECOLOGY

## 2024-04-01 ENCOUNTER — ROUTINE PRENATAL (OUTPATIENT)
Dept: OBSTETRICS AND GYNECOLOGY | Facility: CLINIC | Age: 24
End: 2024-04-01
Payer: MEDICAID

## 2024-04-01 VITALS
SYSTOLIC BLOOD PRESSURE: 107 MMHG | WEIGHT: 201.94 LBS | DIASTOLIC BLOOD PRESSURE: 64 MMHG | BODY MASS INDEX: 32.59 KG/M2

## 2024-04-01 DIAGNOSIS — Z34.03 ENCOUNTER FOR SUPERVISION OF NORMAL FIRST PREGNANCY IN THIRD TRIMESTER: Primary | ICD-10-CM

## 2024-04-01 PROCEDURE — 99999 PR PBB SHADOW E&M-EST. PATIENT-LVL II: CPT | Mod: PBBFAC,,, | Performed by: OBSTETRICS & GYNECOLOGY

## 2024-04-01 PROCEDURE — 99212 OFFICE O/P EST SF 10 MIN: CPT | Mod: PBBFAC,TH,PN | Performed by: OBSTETRICS & GYNECOLOGY

## 2024-04-01 PROCEDURE — 99213 OFFICE O/P EST LOW 20 MIN: CPT | Mod: S$PBB,TH,24, | Performed by: OBSTETRICS & GYNECOLOGY

## 2024-04-01 RX ORDER — ACETAMINOPHEN 325 MG/1
650 TABLET ORAL EVERY 6 HOURS PRN
COMMUNITY
End: 2024-04-01

## 2024-04-01 NOTE — PROGRESS NOTES
24yo  at 31w1d for routine OB visit.  PACs resolved on last US.  Doing well, denies VB/CTX/LOF.  Good fetal movement.   Seen in OB ED for left thigh abscess, I&D in ED.  States it is improving, smaller in size.  Still has 2 more doses of Keflex.     Plan:   - 3T labs today  - Discussed birthing plans - desires nitrous, no epidural.  Has , wants  and parents in room during labor (discussed 2 people while pushing/delivery)  - Contraception counseling done - considering Nexplanon, handout given.  Had IUD in the past with frequent BV.   - Counseling done, precautions given.  RTC 2 wks for OB visit.

## 2024-04-07 ENCOUNTER — PATIENT MESSAGE (OUTPATIENT)
Dept: OTHER | Facility: OTHER | Age: 24
End: 2024-04-07
Payer: MEDICAID

## 2024-04-15 ENCOUNTER — ROUTINE PRENATAL (OUTPATIENT)
Dept: OBSTETRICS AND GYNECOLOGY | Facility: CLINIC | Age: 24
End: 2024-04-15
Payer: MEDICAID

## 2024-04-15 VITALS
WEIGHT: 203.06 LBS | SYSTOLIC BLOOD PRESSURE: 115 MMHG | BODY MASS INDEX: 32.77 KG/M2 | DIASTOLIC BLOOD PRESSURE: 65 MMHG

## 2024-04-15 DIAGNOSIS — Z34.03 ENCOUNTER FOR SUPERVISION OF NORMAL FIRST PREGNANCY IN THIRD TRIMESTER: Primary | ICD-10-CM

## 2024-04-15 DIAGNOSIS — N89.8 VAGINAL IRRITATION: ICD-10-CM

## 2024-04-15 PROCEDURE — 99999 PR PBB SHADOW E&M-EST. PATIENT-LVL II: CPT | Mod: PBBFAC,,, | Performed by: OBSTETRICS & GYNECOLOGY

## 2024-04-15 PROCEDURE — 99212 OFFICE O/P EST SF 10 MIN: CPT | Mod: PBBFAC,TH,PN | Performed by: OBSTETRICS & GYNECOLOGY

## 2024-04-15 PROCEDURE — 99213 OFFICE O/P EST LOW 20 MIN: CPT | Mod: S$PBB,TH,, | Performed by: OBSTETRICS & GYNECOLOGY

## 2024-04-15 PROCEDURE — 81514 NFCT DS BV&VAGINITIS DNA ALG: CPT | Performed by: OBSTETRICS & GYNECOLOGY

## 2024-04-15 RX ORDER — NYSTATIN 100000 U/G
CREAM TOPICAL 2 TIMES DAILY
Qty: 30 G | Refills: 1 | Status: SHIPPED | OUTPATIENT
Start: 2024-04-15 | End: 2024-04-22

## 2024-04-15 NOTE — PROGRESS NOTES
22yo  at 33w1d for routine OB visit.  PACs resolved on last US.  Doing well overall, denies VB/CTX/LOF.  Good fetal movement.     S/p Keflex treatment for inner thigh abscess, now with c/o vaginal irritation.  No itching.  Mostly on the outside/vulvar area.     She also reports left big toe tingling occasionally.  No loss of sensation, no affect on gait.     PE: areas of erythema and excoriations noted along vulvar and inguinal area, c/w candidiasis.   Normal sensation and motor movements of left hallux    Plan:   - Routine OB visit today.    - Vaginosis pending due to vaginal irritation.  Will eRx nystatin cream for likely vulvar candidiasis.   - Birthing plans previously - desires nitrous, no epidural.  Has , wants  and parents in room during labor (discussed 2 people while pushing/delivery).  Considering transferring care to midwifes - appt 24.    - Counseling done, precautions given.  RTC 2 wks for OB visit and GBS.

## 2024-04-16 ENCOUNTER — PATIENT MESSAGE (OUTPATIENT)
Dept: OBSTETRICS AND GYNECOLOGY | Facility: CLINIC | Age: 24
End: 2024-04-16
Payer: MEDICAID

## 2024-04-16 LAB
BACTERIAL VAGINOSIS DNA: NEGATIVE
CANDIDA GLABRATA DNA: NEGATIVE
CANDIDA KRUSEI DNA: NEGATIVE
CANDIDA RRNA VAG QL PROBE: POSITIVE
T VAGINALIS RRNA GENITAL QL PROBE: NEGATIVE

## 2024-04-16 RX ORDER — FLUCONAZOLE 150 MG/1
150 TABLET ORAL DAILY
Qty: 1 TABLET | Refills: 0 | Status: SHIPPED | OUTPATIENT
Start: 2024-04-16 | End: 2024-04-17

## 2024-04-24 ENCOUNTER — PATIENT MESSAGE (OUTPATIENT)
Dept: OBSTETRICS AND GYNECOLOGY | Facility: CLINIC | Age: 24
End: 2024-04-24

## 2024-04-24 ENCOUNTER — INITIAL PRENATAL (OUTPATIENT)
Dept: OBSTETRICS AND GYNECOLOGY | Facility: CLINIC | Age: 24
End: 2024-04-24
Payer: MEDICAID

## 2024-04-24 ENCOUNTER — HOSPITAL ENCOUNTER (OUTPATIENT)
Dept: PERINATAL CARE | Facility: OTHER | Age: 24
Discharge: HOME | End: 2024-04-24
Attending: ADVANCED PRACTICE MIDWIFE
Payer: MEDICAID

## 2024-04-24 VITALS
DIASTOLIC BLOOD PRESSURE: 64 MMHG | SYSTOLIC BLOOD PRESSURE: 106 MMHG | BODY MASS INDEX: 32.77 KG/M2 | WEIGHT: 203.06 LBS | HEART RATE: 104 BPM

## 2024-04-24 DIAGNOSIS — Z3A.34 34 WEEKS GESTATION OF PREGNANCY: Primary | ICD-10-CM

## 2024-04-24 DIAGNOSIS — O36.8390 FETAL TACHYCARDIA AFFECTING MANAGEMENT OF MOTHER: ICD-10-CM

## 2024-04-24 DIAGNOSIS — O36.8390 FETAL ARRHYTHMIA AFFECTING PREGNANCY, ANTEPARTUM: ICD-10-CM

## 2024-04-24 PROCEDURE — 76815 OB US LIMITED FETUS(S): CPT

## 2024-04-24 PROCEDURE — 99999 PR PBB SHADOW E&M-EST. PATIENT-LVL III: CPT | Mod: PBBFAC,,, | Performed by: ADVANCED PRACTICE MIDWIFE

## 2024-04-24 PROCEDURE — 59025 FETAL NON-STRESS TEST: CPT | Mod: 26,,, | Performed by: OBSTETRICS & GYNECOLOGY

## 2024-04-24 PROCEDURE — 99213 OFFICE O/P EST LOW 20 MIN: CPT | Mod: TH,S$PBB,, | Performed by: ADVANCED PRACTICE MIDWIFE

## 2024-04-24 PROCEDURE — 76815 OB US LIMITED FETUS(S): CPT | Mod: 26,,, | Performed by: OBSTETRICS & GYNECOLOGY

## 2024-04-24 PROCEDURE — 99213 OFFICE O/P EST LOW 20 MIN: CPT | Mod: PBBFAC,25,TH | Performed by: ADVANCED PRACTICE MIDWIFE

## 2024-04-24 PROCEDURE — 59025 FETAL NON-STRESS TEST: CPT

## 2024-04-24 NOTE — PROGRESS NOTES
23 y.o.,  at 34w3d by 7w ultrasound    Patient presents today for a transfer initial prenatal visit. Patient reports they have been receiving regular, routine prenatal care at [facility] with Dr. Hui.    Here without concerns. Reports she completed the prescription for yeast infection last week and all symptoms have resolved.    SOCIAL HISTORY: Denies emotional/mental/physical/sexual violence or abuse. Feels safe at home. Accompanied today by her mother, Ms. Jackson. Not living or in a relationship with father of baby, he is in [place]. Unplanned pregnancy. This is her first baby.   Denies tobacco, alcohol, or substance use during the pregnancy.      Patient reports prepregnancy weight: 175lbs. TWlbs   Patient reports most recent pap smear: Reports possibly performed at beginning of this pregnancy in [place], but uncertain. (In review of Care Everywhere, I was unable to find any record of one being performed. Will plan to collect postpartum.)    Denies UCs/cramping, no LOF or VB. Reports active/normal fetal movement    History reviewed. No pertinent past medical history.  Past Surgical History:   Procedure Laterality Date    TONSILLECTOMY       OB History    Para Term  AB Living   2       1     SAB IAB Ectopic Multiple Live Births                  # Outcome Date GA Lbr Braden/2nd Weight Sex Type Anes PTL Lv   2 Current            1 AB 2021 6w0d    SAB          PHYSICAL EXAM  /64   Pulse 104   Wt 92.1 kg (203 lb 0.7 oz)   LMP 2023   BMI 32.77 kg/m²   GENERAL: No acute distress  HEENT: Normocephalic, atruamatic  NEURO: Alert and oriented x3  PULMONARY: Non-labored respiration; no tachypnea  ABD: Soft, gravid, nontender    ASSESSMENT AND PLAN  34 weeks gestation of pregnancy  -     BREAST PUMP FOR HOME USE  -     Prenatal Testing- VIEWPOINT; Standing    Fetal tachycardia affecting management of mother  -     Prenatal Testing- VIEWPOINT; Standing  -     Sustained fetal  tachycardia with doppler in visit: 170-175 for >1min. Pt to prenatal testing for NST    Patient was oriented to practice and progression of routine prenatal care. Reviewed New OB Pregnancy packet & questions answered. After hours contact info given.  Patient does have copy of prenatal records here with her today.   Initial labs and dating u/s reviewed.   Discussed diet, along with substances & foods to avoid in pregnancy (i.e. sushi, fish that are high in mercury, deli meat, and unpasteurized cheeses).   Discussed prenatal vitamin options (i.e. stool softener, DHA).    Review exercise precautions in pregnancy, along with recommendations. Patient reports does exercise regularly - walking.     Prepregnancy BMI 28  Counseled today on proper weight gain based on the [place] [employer]'s recommendations based on pre-pregnancy weight.  -- Discussed IOM recommended weight gain of:   Weight category Pre pre BMI  Recommended TWG   Underweight Less than 18.5 28-40    Normal Weight 18.5-24.9  25-35    Overweight 25-29.9  15-25    Obese   30 and greater  11-20     Vaccine Counseling:    - Reports has received Covid-19 vaccine in the past - did not receive for 2023/24 season. Education regarding vaccine recommendations in pregnancy;    - Education regarding CDC recommendations for TDAP in pregnancy, reviewed risks/benefits to this. Patient has received this.    Birth Center Risk Assessment: 0- Meets birth center guidelines    CNM management in ABC  CNM management on L&D  Consultation with OB to develop  plan of care  Collaborative CNM/OB management with delivery on L&D  Permanent referral of care to MD    Education regarding warning signs.    Follow up: 1 wks, call or present sooner prn.     Emily Logan CNM/KIMBERLEE

## 2024-05-08 ENCOUNTER — ROUTINE PRENATAL (OUTPATIENT)
Dept: OBSTETRICS AND GYNECOLOGY | Facility: CLINIC | Age: 24
End: 2024-05-08
Payer: MEDICAID

## 2024-05-08 VITALS
WEIGHT: 208.44 LBS | HEART RATE: 112 BPM | DIASTOLIC BLOOD PRESSURE: 66 MMHG | SYSTOLIC BLOOD PRESSURE: 106 MMHG | BODY MASS INDEX: 33.64 KG/M2

## 2024-05-08 DIAGNOSIS — Z36.85 ANTENATAL SCREENING FOR STREPTOCOCCUS B: ICD-10-CM

## 2024-05-08 DIAGNOSIS — Z3A.37 37 WEEKS GESTATION OF PREGNANCY: Primary | ICD-10-CM

## 2024-05-08 PROBLEM — Z34.90 PREGNANCY: Status: ACTIVE | Noted: 2024-05-08

## 2024-05-08 PROCEDURE — 99999 PR PBB SHADOW E&M-EST. PATIENT-LVL II: CPT | Mod: PBBFAC,,, | Performed by: ADVANCED PRACTICE MIDWIFE

## 2024-05-08 PROCEDURE — 87081 CULTURE SCREEN ONLY: CPT | Performed by: ADVANCED PRACTICE MIDWIFE

## 2024-05-08 PROCEDURE — 99213 OFFICE O/P EST LOW 20 MIN: CPT | Mod: S$PBB,TH,, | Performed by: ADVANCED PRACTICE MIDWIFE

## 2024-05-08 PROCEDURE — 99212 OFFICE O/P EST SF 10 MIN: CPT | Mod: PBBFAC,TH | Performed by: ADVANCED PRACTICE MIDWIFE

## 2024-05-08 NOTE — PROGRESS NOTES
23 y.o.,  at 37w2d presents today for routine OB appt.  Denies regular UCs, LOF or VB. Reports some BH. Requests SVE today. Reports good FM. Doing well without concerns.  TW lbs     PHYSICAL EXAM  GENERAL: No acute distress  HEENT: Normocephalic, atraumatic  NEURO: Alert and oriented x3  PULMONARY: Non-labored respiration; no tachypnea  ABD: Soft, gravid, nontender.    FH: 37cm  FHR: 145  SVE: FT/thick/high    ASSESSMENT AND PLAN  37 weeks gestation of pregnancy      Reviewed GBS and need for intrapartum abx  Reviewed repeat 3rd trimester HIV/RPR, Order for TPA placed  Education regarding labor precautions.  Reviewed warning signs, normal FM, and how/when to call.    Discussed nitrous oxide. Discussed that nitrous oxide is only available on L&D and administered by anesthesia. Patient desires ABC birth but verbalizes understanding that if nitrous oxide is desired for labor, she would need to be admitted to L&D.     Follow-up: 1 week, call or present sooner KEON Kitchen

## 2024-05-10 NOTE — PROGRESS NOTES
I have reviewed and concur with the [facility]'s history, physical, assessment, and plan.  I have personally interviewed and examined the patient at bedside.  See below addendum for my evaluation and additional findings.

## 2024-05-11 LAB — BACTERIA SPEC AEROBE CULT: NORMAL

## 2024-05-12 ENCOUNTER — TELEPHONE (OUTPATIENT)
Dept: OBSTETRICS AND GYNECOLOGY | Facility: OTHER | Age: 24
End: 2024-05-12
Payer: MEDICAID

## 2024-05-12 ENCOUNTER — HOSPITAL ENCOUNTER (EMERGENCY)
Facility: OTHER | Age: 24
Discharge: HOME | End: 2024-05-12
Attending: OBSTETRICS & GYNECOLOGY
Payer: MEDICAID

## 2024-05-12 VITALS
DIASTOLIC BLOOD PRESSURE: 61 MMHG | SYSTOLIC BLOOD PRESSURE: 101 MMHG | TEMPERATURE: 98 F | HEART RATE: 85 BPM | RESPIRATION RATE: 18 BRPM | OXYGEN SATURATION: 97 %

## 2024-05-12 DIAGNOSIS — Z3A.37 37 WEEKS GESTATION OF PREGNANCY: Primary | ICD-10-CM

## 2024-05-12 DIAGNOSIS — O36.8130 DECREASED FETAL MOVEMENTS IN THIRD TRIMESTER, SINGLE OR UNSPECIFIED FETUS: ICD-10-CM

## 2024-05-12 DIAGNOSIS — Z3A.37 37 WEEKS GESTATION OF PREGNANCY: ICD-10-CM

## 2024-05-12 DIAGNOSIS — O36.8130 DECREASED FETAL MOVEMENTS IN THIRD TRIMESTER, SINGLE OR UNSPECIFIED FETUS: Primary | ICD-10-CM

## 2024-05-12 DIAGNOSIS — O36.5931 SGA (SMALL FOR GESTATIONAL AGE), FETAL, AFFECTING CARE OF MOTHER, ANTEPARTUM, THIRD TRIMESTER, FETUS 1: ICD-10-CM

## 2024-05-12 PROCEDURE — 99284 EMERGENCY DEPT VISIT MOD MDM: CPT | Mod: 25

## 2024-05-12 PROCEDURE — 99284 EMERGENCY DEPT VISIT MOD MDM: CPT | Mod: 25,,, | Performed by: OBSTETRICS & GYNECOLOGY

## 2024-05-12 PROCEDURE — 59025 FETAL NON-STRESS TEST: CPT

## 2024-05-12 PROCEDURE — 59025 FETAL NON-STRESS TEST: CPT | Mod: 26,,, | Performed by: OBSTETRICS & GYNECOLOGY

## 2024-05-12 NOTE — ED PROVIDER NOTES
Encounter Date: 2024       History     Chief Complaint   Patient presents with    Decreased Fetal Movement     Cole Jackson is a 23 y.o. female  at 37w6d with Estimated Date of Delivery: 24 who presents to [facility] due to no fetal movement x 12 hours.  Denies contractions, decreased fetal movement, no vaginal bleeding, no loss of fluid.     This pregnancy has been complicated by fetal arrhythmia  (noted on 26 week growth scan- had fetal tachycardia at 34 wk SCOTT, with reactive/reassuring NST after.       Presentation: Cephalic   Estimated Fetal Weight: 6 lb 2 oz by bedside growth US            Review of patient's allergies indicates:   Allergen Reactions    Peanut Itching and Rash     Only when eaten - itching throat and eyes, but subsides with benadryl; epipen     No past medical history on file.  Past Surgical History:   Procedure Laterality Date    TONSILLECTOMY       Family History   Problem Relation Name Age of Onset    No Known Problems Paternal Grandmother      No Known Problems Father      No Known Problems Brother      Ovarian cancer Neg Hx      Diabetes Neg Hx       Social History     Tobacco Use    Smoking status: Never     Passive exposure: Never    Smokeless tobacco: Never   Substance Use Topics    Alcohol use: No    Drug use: Never     Review of Systems   Gastrointestinal:  Negative for abdominal pain.   Genitourinary:  Negative for vaginal bleeding and vaginal pain.       Physical Exam     Initial Vitals   BP Pulse Resp Temp SpO2   24 0843 24 0842 24 0843 24 0843 24 0842   111/65 96 18 98.2 °F (36.8 °C) 98 %      MAP       --                Physical Exam    Vitals reviewed.  Constitutional: She appears well-developed and well-nourished.   HENT:   Head: Normocephalic and atraumatic.   Eyes: Pupils are equal, round, and reactive to light.   Neck:   Normal range of motion.  Cardiovascular:  Normal rate.           Pulmonary/Chest: No respiratory distress.    Abdominal: Abdomen is soft.   Musculoskeletal:         General: Normal range of motion.      Cervical back: Normal range of motion.     Neurological: She is alert and oriented to person, place, and time.   Skin: Skin is warm and dry.   Psychiatric: She has a normal mood and affect. Her behavior is normal. Judgment and thought content normal.         ED Course   Obtain Fetal nonstress test (NST)    Date/Time: 5/12/2024 9:28 AM    Performed by: Laura Landa CNM  Authorized by: Laura Landa CNM    Nonstress Test:     Variability:  6-25 BPM    Decelerations:  None    Accelerations:  15 bpm    Acoustic Stimulator: No      Baseline:  140    Uterine Irritability: No      Contractions:  Irregular  Biophysical Profile:     Nonstress Test Interpretation: reactive      Overall Impression:  Reassuring  Post-procedure:     Patient tolerance:  Patient tolerated the procedure well with no immediate complications    Labs Reviewed - No data to display       Imaging Results    None          Medications - No data to display  Medical Decision Making  NST reactive and reassuring  Patient endorses feeling active movement during NST  Growth US performed by Dr. Crawley, SGA on sono today (difficult scan due to fetal position); 6 lb 2 oz EFW 11%, AC <2.5%, ROBERTO 16.31; see media for further details. Recommend MFM growth US this week to rule out FGR. Will coordinate with MFM.  Discharge home with strict FMC and follow up with MFM and [facility] clinic.                                      Clinical Impression:  Final diagnoses:  [Z3A.37] 37 weeks gestation of pregnancy (Primary)  [O36.8130] Decreased fetal movements in third trimester, single or unspecified fetus                 Laura Landa CNM  05/12/24 0929

## 2024-05-12 NOTE — DISCHARGE INSTRUCTIONS
Please return to Labor and Delivery if you experience any leaking of fluid (like your water bag broke), vaginal bleeding (soaking more than 1 pad per hour), decreased fetal movement, and/or having contractions every 2-5 minutes for 2 hours.     Labor and Delivery: [phone]

## 2024-05-13 ENCOUNTER — PROCEDURE VISIT (OUTPATIENT)
Dept: MATERNAL FETAL MEDICINE | Facility: CLINIC | Age: 24
End: 2024-05-13
Payer: MEDICAID

## 2024-05-13 ENCOUNTER — OFFICE VISIT (OUTPATIENT)
Dept: MATERNAL FETAL MEDICINE | Facility: CLINIC | Age: 24
End: 2024-05-13
Payer: MEDICAID

## 2024-05-13 ENCOUNTER — OFFICE VISIT (OUTPATIENT)
Dept: OBSTETRICS AND GYNECOLOGY | Facility: CLINIC | Age: 24
End: 2024-05-13
Payer: MEDICAID

## 2024-05-13 VITALS — DIASTOLIC BLOOD PRESSURE: 71 MMHG | SYSTOLIC BLOOD PRESSURE: 105 MMHG

## 2024-05-13 DIAGNOSIS — O36.5931 SGA (SMALL FOR GESTATIONAL AGE), FETAL, AFFECTING CARE OF MOTHER, ANTEPARTUM, THIRD TRIMESTER, FETUS 1: ICD-10-CM

## 2024-05-13 DIAGNOSIS — Z3A.37 37 WEEKS GESTATION OF PREGNANCY: ICD-10-CM

## 2024-05-13 DIAGNOSIS — O36.5990 FETAL GROWTH RESTRICTION ANTEPARTUM: Primary | ICD-10-CM

## 2024-05-13 DIAGNOSIS — O36.5990 PREGNANCY AFFECTED BY FETAL GROWTH RESTRICTION: Primary | ICD-10-CM

## 2024-05-13 DIAGNOSIS — O36.8130 DECREASED FETAL MOVEMENTS IN THIRD TRIMESTER, SINGLE OR UNSPECIFIED FETUS: ICD-10-CM

## 2024-05-13 PROCEDURE — 76819 FETAL BIOPHYS PROFIL W/O NST: CPT | Mod: 26,S$PBB,, | Performed by: OBSTETRICS & GYNECOLOGY

## 2024-05-13 PROCEDURE — 99204 OFFICE O/P NEW MOD 45 MIN: CPT | Mod: S$PBB,TH,, | Performed by: OBSTETRICS & GYNECOLOGY

## 2024-05-13 PROCEDURE — 99212 OFFICE O/P EST SF 10 MIN: CPT | Mod: PBBFAC,TH,25

## 2024-05-13 PROCEDURE — 76816 OB US FOLLOW-UP PER FETUS: CPT | Mod: PBBFAC | Performed by: OBSTETRICS & GYNECOLOGY

## 2024-05-13 PROCEDURE — 76820 UMBILICAL ARTERY ECHO: CPT | Mod: 26,S$PBB,, | Performed by: OBSTETRICS & GYNECOLOGY

## 2024-05-13 PROCEDURE — 76816 OB US FOLLOW-UP PER FETUS: CPT | Mod: 26,S$PBB,, | Performed by: OBSTETRICS & GYNECOLOGY

## 2024-05-13 PROCEDURE — 1159F MED LIST DOCD IN RCRD: CPT | Mod: CPTII,,,

## 2024-05-13 PROCEDURE — 99999 PR PBB SHADOW E&M-EST. PATIENT-LVL II: CPT | Mod: PBBFAC,,,

## 2024-05-13 PROCEDURE — 76820 UMBILICAL ARTERY ECHO: CPT | Mod: PBBFAC | Performed by: OBSTETRICS & GYNECOLOGY

## 2024-05-13 PROCEDURE — 76819 FETAL BIOPHYS PROFIL W/O NST: CPT | Mod: PBBFAC | Performed by: OBSTETRICS & GYNECOLOGY

## 2024-05-13 PROCEDURE — 99214 OFFICE O/P EST MOD 30 MIN: CPT | Mod: S$PBB,TH,,

## 2024-05-13 NOTE — ASSESSMENT & PLAN NOTE
The patients fetus has been diagnosed with FGR based on AC < 10th percentile (6%). EFW is at the 12%. Potential etiologies of FGR include but are not limited to normal variation of stature, placental insufficiency, chromosomal abnormalities, genetic disorders, infections, medical conditions, teratogen exposure and other etiologies. Pregnancies complicated by FGR are at increased risk of stillbirth. We discussed delivery timing and recommendations for antepartum testing. FGR, by itself, is not an indication for  delivery, although there is an increased risk of needing a  due to heart rate abnormalities. Mode of delivery will be dictated by overall clinical status and doppler abnormalities (if any).   Please note, EPIC and  had differing DOMINIQUE. I reviewed the chart from [place] (where she received early PNC) and found two early US consistent with her LMP dating, all with the same DOMINIQUE of 24, which puts her today at 37w1d. I adjusted the [facility] DOMINIQUE to reflect this.   Ms. Harmon was informed of today's US and DOMINIQUE modification and will see patient today.    Recommendations:  Start twice weekly  fetal surveillance with NST and BPP until delivery.   Start weekly UA dopplers.   (Should be ordered and arranged by the patient's primary OB provider).  Patient counseled re: fetal movement monitoring and decreased fetal movement  Monitor closely for any signs of evolving preeclampsia.  If  testing is non-reassuring, delivery may be warranted regardless of GA.  For all pregnancies with FGR, the placenta should be sent to pathology for examination.  Mode of delivery will be dictated by overall clinical status and doppler abnormalities (if any).       General delivery timing (EFW as opposed to AC percentile should be used to determine delivery timing):  Exceptions to these recommendations may occur (e.g. gestational age <26 weeks). However, in general, delivery should be considered when:    FGR  (EFW 3rd-9th or EFW > 10th with AC < 10th percentile)  Normal testing, No co-morbid conditions: 380/7- 390/7  UA Doppler S/D ratio > 95th percentile: 370/7- 376/7    Earlier delivery can be considered in conjunction with MFM in the setting of FGR with preeclampsia/gestational hypertension (360/7-370/7 weeks), multifetal pregnancy, or UA Doppler abnormalities (ie EFW < 3rd percentile with elevated UA Dopplers)     FGR + Oligohydramnios: At diagnosis, if GA ? 34 weeks (if less than 34 weeks, management needs to be individualized based on other testing and entire clinical scenario)  FGR + AEDF: By 34 weeks (33 0/7-34 6/7 weeks) if there is absent diastolic flow in the umbilical artery and there has not been a previous indication for delivery  FGR + REDF: By 32 weeks (30 0/7-32 6/7 weeks), if there is reversed diastolic flow in the umbilical artery and there has not been a previous indication for delivery

## 2024-05-13 NOTE — PROGRESS NOTES
MATERNAL-FETAL MEDICINE   CONSULT NOTE    Provider requesting consultation: urgent Add    SUBJECTIVE:     Ms. Cole Jackson is a 23 y.o.  female with IUP at 37w1d who is seen in consultation by MFM for evaluation and management of:  Problem   Pregnancy Affected By Fetal Growth Restriction     Patient has no complaints today. She is overall feeling well.  Patient denies any contractions/cramping, vaginal bleeding or leakage of fluid.  She reports good fetal movement.       Medication List with Changes/Refills   Current Medications    CETIRIZINE (ZYRTEC) 10 MG TABLET    Take 1 tablet (10 mg total) by mouth once daily.    LACTOBACILLUS ACIDOPHILUS (PROBIOTIC ORAL)    Take by mouth.    NYSTATIN (MYCOSTATIN) CREAM    Apply topically 2 (two) times daily. for 7 days    PNV NO.95/FERROUS FUM/FOLIC AC (PRENATAL ORAL)    Take by mouth.     Review of patient's allergies indicates:   Allergen Reactions    Peanut Itching and Rash     Only when eaten - itching throat and eyes, but subsides with benadryl; epipen       PMH:No past medical history on file.  PObHx:  OB History    Para Term  AB Living   2       1     SAB IAB Ectopic Multiple Live Births                  # Outcome Date GA Lbr Braden/2nd Weight Sex Type Anes PTL Lv   2 Current            1 AB 2021 6w0d    SAB        PSH:  Past Surgical History:   Procedure Laterality Date    TONSILLECTOMY         Family history:family history includes No Known Problems in her brother, father, and paternal grandmother.    Social history: reports that she has never smoked. She has never been exposed to tobacco smoke. She has never used smokeless tobacco. She reports that she does not drink alcohol and does not use drugs.    Objective:   LMP 2023     Physical Exam  deferred    Ultrasound performed. See viewpoint for full ultrasound report.  [facility]o live IUP  Fetal size is consistent with fetal growth restriction, with the EFW plotting at the 12% (2619 g) and  the AC plotting at the 6%.   A limited repeat fetal anatomic survey appears normal.   The BPP score is reassuring at 8/8.  The MVP is normal.  Umbilical artery dopplers are normal with persistent forward flow, S/D ratio 49%  cephalic presentation    Significant labs/imaging:  [facility] - Low risk XY (see Media)    ASSESSMENT/PLAN:     23 y.o.  female with IUP at 37w1d     Pregnancy affected by fetal growth restriction  The patients fetus has been diagnosed with FGR based on AC < 10th percentile (6%). EFW is at the 12%. Potential etiologies of FGR include but are not limited to normal variation of stature, placental insufficiency, chromosomal abnormalities, genetic disorders, infections, medical conditions, teratogen exposure and other etiologies. Pregnancies complicated by FGR are at increased risk of stillbirth. We discussed delivery timing and recommendations for antepartum testing. FGR, by itself, is not an indication for  delivery, although there is an increased risk of needing a  due to heart rate abnormalities. Mode of delivery will be dictated by overall clinical status and doppler abnormalities (if any).   Please note, EPIC and  had differing DOMINIQUE. I reviewed the chart from [place] (where she received early PNC) and found two early US consistent with her LMP dating, all with the same DOMINIQUE of 24, which puts her today at 37w1d. I adjusted the [facility] DOMINIQUE to reflect this.   Ms. Harmon was informed of today's US and DOMINIQUE modification and will see patient today.    Recommendations:  Start twice weekly  fetal surveillance with NST and BPP until delivery.   Start weekly UA dopplers.   (Should be ordered and arranged by the patient's primary OB provider).  Patient counseled re: fetal movement monitoring and decreased fetal movement  Monitor closely for any signs of evolving preeclampsia.  If  testing is non-reassuring, delivery may be warranted regardless of GA.  For all  pregnancies with FGR, the placenta should be sent to pathology for examination.  Mode of delivery will be dictated by overall clinical status and doppler abnormalities (if any).       General delivery timing (EFW as opposed to AC percentile should be used to determine delivery timing):  Exceptions to these recommendations may occur (e.g. gestational age <26 weeks). However, in general, delivery should be considered when:    FGR (EFW 3rd-9th or EFW > 10th with AC < 10th percentile)  Normal testing, No co-morbid conditions: 380/7- 390/7  UA Doppler S/D ratio > 95th percentile: 370/7- 376/7    Earlier delivery can be considered in conjunction with MFM in the setting of FGR with preeclampsia/gestational hypertension (360/7-370/7 weeks), multifetal pregnancy, or UA Doppler abnormalities (ie EFW < 3rd percentile with elevated UA Dopplers)     FGR + Oligohydramnios: At diagnosis, if GA ? 34 weeks (if less than 34 weeks, management needs to be individualized based on other testing and entire clinical scenario)  FGR + AEDF: By 34 weeks (33 0/7-34 6/7 weeks) if there is absent diastolic flow in the umbilical artery and there has not been a previous indication for delivery  FGR + REDF: By 32 weeks (30 0/7-32 6/7 weeks), if there is reversed diastolic flow in the umbilical artery and there has not been a previous indication for delivery      Patient was counseled that prenatal ultrasound studies have limitations. They do not detect all fetal, genetic, placental, and maternal abnormalities.       FOLLOW UP: No further ultrasounds or visits were scheduled.     The patient was given an opportunity to ask questions about the management of her high risk pregnancy problems. She expressed an understanding of and agreement to the above impression and plan. All questions were answered to her satisfaction.      Wilbert Goins MD   Maternal-Fetal Medicine      Electronically Signed by Wilbert Goins May 13, 2024

## 2024-05-14 ENCOUNTER — TELEPHONE (OUTPATIENT)
Dept: OBSTETRICS AND GYNECOLOGY | Facility: CLINIC | Age: 24
End: 2024-05-14
Payer: MEDICAID

## 2024-05-14 NOTE — TELEPHONE ENCOUNTER
----- Message from Kimberley Deutsch MA sent at 5/14/2024  2:35 PM CDT -----  Regarding: FW: Self  [phone]    ----- Message -----  From: Ruiz Ball  Sent: 5/14/2024   2:11 PM CDT  To: [facility] Staff  Subject: Self  [phone]                               Type: Patient Call Back    Who called: Self     What is the request in detail: called stating that she would like a 2nd opinion on her being told that her child has a growth restriction, making her a high risk pregnancy. Would like a call back.     Can the clinic reply by [facility]? No     Would the patient rather a call back or a response via [facility]? Call back     Best call back number: [phone]     Additional Information:    Thank you.

## 2024-05-15 ENCOUNTER — TELEPHONE (OUTPATIENT)
Dept: OBSTETRICS AND GYNECOLOGY | Facility: CLINIC | Age: 24
End: 2024-05-15
Payer: MEDICAID

## 2024-05-15 NOTE — TELEPHONE ENCOUNTER
----- Message from Kimberley Deutsch MA sent at 5/15/2024  1:18 PM CDT -----  Regarding: GROWTH OF BABY/// INDUCTION  Patient wants you to call her about induction and growth of baby . Patient stated she have a few question and would like you call her for you opinion ..

## 2024-05-15 NOTE — TELEPHONE ENCOUNTER
Spoke with patient.  Discussed medical indication for IOL, and what to expect with IOL.  All questions answered.  Patient currently schedule for IOL 5/21 at 0440 at 38.2wga.  Agrees with plan.  Currently seen by CNM.

## 2024-05-16 ENCOUNTER — ROUTINE PRENATAL (OUTPATIENT)
Dept: OBSTETRICS AND GYNECOLOGY | Facility: CLINIC | Age: 24
End: 2024-05-16
Payer: MEDICAID

## 2024-05-16 ENCOUNTER — HOSPITAL ENCOUNTER (OUTPATIENT)
Dept: PERINATAL CARE | Facility: OTHER | Age: 24
Discharge: HOME | End: 2024-05-16
Payer: MEDICAID

## 2024-05-16 ENCOUNTER — HOSPITAL ENCOUNTER (EMERGENCY)
Facility: OTHER | Age: 24
Discharge: HOME | End: 2024-05-16
Attending: OBSTETRICS & GYNECOLOGY
Payer: MEDICAID

## 2024-05-16 VITALS — HEART RATE: 67 BPM | SYSTOLIC BLOOD PRESSURE: 116 MMHG | OXYGEN SATURATION: 100 % | DIASTOLIC BLOOD PRESSURE: 69 MMHG

## 2024-05-16 VITALS
WEIGHT: 210.88 LBS | HEART RATE: 89 BPM | BODY MASS INDEX: 34.04 KG/M2 | DIASTOLIC BLOOD PRESSURE: 67 MMHG | SYSTOLIC BLOOD PRESSURE: 118 MMHG

## 2024-05-16 DIAGNOSIS — Z3A.37 37 WEEKS GESTATION OF PREGNANCY: Primary | ICD-10-CM

## 2024-05-16 DIAGNOSIS — O26.899 HEADACHE IN PREGNANCY, ANTEPARTUM: ICD-10-CM

## 2024-05-16 DIAGNOSIS — R51.9 HEADACHE IN PREGNANCY, ANTEPARTUM: Primary | ICD-10-CM

## 2024-05-16 DIAGNOSIS — O26.899 HEADACHE IN PREGNANCY, ANTEPARTUM: Primary | ICD-10-CM

## 2024-05-16 DIAGNOSIS — O36.8131 DECREASED FETAL MOVEMENT AFFECTING MANAGEMENT OF PREGNANCY IN THIRD TRIMESTER, FETUS 1: ICD-10-CM

## 2024-05-16 DIAGNOSIS — O36.5990 FETAL GROWTH RESTRICTION ANTEPARTUM: ICD-10-CM

## 2024-05-16 DIAGNOSIS — Z3A.34 34 WEEKS GESTATION OF PREGNANCY: ICD-10-CM

## 2024-05-16 DIAGNOSIS — O36.8390 FETAL ARRHYTHMIA AFFECTING PREGNANCY, ANTEPARTUM: ICD-10-CM

## 2024-05-16 DIAGNOSIS — R51.9 HEADACHE IN PREGNANCY, ANTEPARTUM: ICD-10-CM

## 2024-05-16 LAB
ALBUMIN SERPL BCP-MCNC: 2.6 G/DL (ref 3.5–5.2)
ALP SERPL-CCNC: 249 U/L (ref 55–135)
ALT SERPL W/O P-5'-P-CCNC: 33 U/L (ref 10–44)
ANION GAP SERPL CALC-SCNC: 9 MMOL/L (ref 8–16)
AST SERPL-CCNC: 28 U/L (ref 10–40)
BASOPHILS # BLD AUTO: 0.07 K/UL (ref 0–0.2)
BASOPHILS NFR BLD: 0.7 % (ref 0–1.9)
BILIRUB SERPL-MCNC: 0.2 MG/DL (ref 0.1–1)
BUN SERPL-MCNC: 4 MG/DL (ref 6–20)
CALCIUM SERPL-MCNC: 8.8 MG/DL (ref 8.7–10.5)
CHLORIDE SERPL-SCNC: 106 MMOL/L (ref 95–110)
CO2 SERPL-SCNC: 21 MMOL/L (ref 23–29)
CREAT SERPL-MCNC: 0.6 MG/DL (ref 0.5–1.4)
CREAT UR-MCNC: 117 MG/DL (ref 15–325)
DIFFERENTIAL METHOD BLD: ABNORMAL
EOSINOPHIL # BLD AUTO: 0.2 K/UL (ref 0–0.5)
EOSINOPHIL NFR BLD: 1.9 % (ref 0–8)
ERYTHROCYTE [DISTWIDTH] IN BLOOD BY AUTOMATED COUNT: 12.4 % (ref 11.5–14.5)
EST. GFR  (NO RACE VARIABLE): >60 ML/MIN/1.73 M^2
GLUCOSE SERPL-MCNC: 73 MG/DL (ref 70–110)
HCT VFR BLD AUTO: 35.9 % (ref 37–48.5)
HGB BLD-MCNC: 11.8 G/DL (ref 12–16)
IMM GRANULOCYTES # BLD AUTO: 0.33 K/UL (ref 0–0.04)
IMM GRANULOCYTES NFR BLD AUTO: 3.1 % (ref 0–0.5)
LYMPHOCYTES # BLD AUTO: 1.9 K/UL (ref 1–4.8)
LYMPHOCYTES NFR BLD: 17.9 % (ref 18–48)
MCH RBC QN AUTO: 29.9 PG (ref 27–31)
MCHC RBC AUTO-ENTMCNC: 32.9 G/DL (ref 32–36)
MCV RBC AUTO: 91 FL (ref 82–98)
MONOCYTES # BLD AUTO: 0.8 K/UL (ref 0.3–1)
MONOCYTES NFR BLD: 7.6 % (ref 4–15)
NEUTROPHILS # BLD AUTO: 7.4 K/UL (ref 1.8–7.7)
NEUTROPHILS NFR BLD: 68.8 % (ref 38–73)
NRBC BLD-RTO: 0 /100 WBC
PLATELET # BLD AUTO: 220 K/UL (ref 150–450)
PMV BLD AUTO: 11.1 FL (ref 9.2–12.9)
POTASSIUM SERPL-SCNC: 4.1 MMOL/L (ref 3.5–5.1)
PROT SERPL-MCNC: 6.3 G/DL (ref 6–8.4)
PROT UR-MCNC: 12 MG/DL (ref 0–15)
PROT/CREAT UR: 0.1 MG/G{CREAT} (ref 0–0.2)
RBC # BLD AUTO: 3.95 M/UL (ref 4–5.4)
SODIUM SERPL-SCNC: 136 MMOL/L (ref 136–145)
WBC # BLD AUTO: 10.7 K/UL (ref 3.9–12.7)

## 2024-05-16 PROCEDURE — 80053 COMPREHEN METABOLIC PANEL: CPT | Performed by: ADVANCED PRACTICE MIDWIFE

## 2024-05-16 PROCEDURE — 99284 EMERGENCY DEPT VISIT MOD MDM: CPT | Mod: 25,27

## 2024-05-16 PROCEDURE — 99284 EMERGENCY DEPT VISIT MOD MDM: CPT | Mod: 25,,, | Performed by: OBSTETRICS & GYNECOLOGY

## 2024-05-16 PROCEDURE — 59025 FETAL NON-STRESS TEST: CPT | Mod: 26,,, | Performed by: OBSTETRICS & GYNECOLOGY

## 2024-05-16 PROCEDURE — 85025 COMPLETE CBC W/AUTO DIFF WBC: CPT | Performed by: ADVANCED PRACTICE MIDWIFE

## 2024-05-16 PROCEDURE — 99213 OFFICE O/P EST LOW 20 MIN: CPT | Mod: PBBFAC,25,TH

## 2024-05-16 PROCEDURE — 99214 OFFICE O/P EST MOD 30 MIN: CPT | Mod: S$PBB,TH,25,

## 2024-05-16 PROCEDURE — 25000003 PHARM REV CODE 250: Performed by: ADVANCED PRACTICE MIDWIFE

## 2024-05-16 PROCEDURE — 99999 PR PBB SHADOW E&M-EST. PATIENT-LVL III: CPT | Mod: PBBFAC,,,

## 2024-05-16 PROCEDURE — 82570 ASSAY OF URINE CREATININE: CPT | Performed by: ADVANCED PRACTICE MIDWIFE

## 2024-05-16 PROCEDURE — 76818 FETAL BIOPHYS PROFILE W/NST: CPT

## 2024-05-16 PROCEDURE — 59025 FETAL NON-STRESS TEST: CPT | Mod: 59

## 2024-05-16 PROCEDURE — 76818 FETAL BIOPHYS PROFILE W/NST: CPT | Mod: 26,,, | Performed by: OBSTETRICS & GYNECOLOGY

## 2024-05-16 RX ORDER — ACETAMINOPHEN 500 MG
1000 TABLET ORAL EVERY 6 HOURS PRN
Status: DISCONTINUED | OUTPATIENT
Start: 2024-05-16 | End: 2024-05-16 | Stop reason: HOSPADM

## 2024-05-16 RX ADMIN — ACETAMINOPHEN 1000 MG: 500 TABLET ORAL at 01:05

## 2024-05-16 NOTE — PROGRESS NOTES
23 y.o.,  at 37w4d presents today for routine OB appt.  Denies regular UCs, LOF or VB. Reports decreased fetal movement, feeling dehydrated despite PO hydration, and headache (took Tylenol once since initiation of headache )  TW lbs     PHYSICAL EXAM  GENERAL: No acute distress  HEENT: Normocephalic, atraumatic  NEURO: Alert and oriented x3  PULMONARY: Non-labored respiration; no tachypnea  ABD: Soft, gravid, nontender.    ASSESSMENT AND PLAN  37 weeks gestation of pregnancy    Fetal growth restriction antepartum    Fetal arrhythmia affecting pregnancy, antepartum    Decreased fetal movement affecting management of pregnancy in third trimester, fetus 1    Headache in pregnancy, antepartum        Delivery consents signed today  Discussed IOL methods, risks, benefits at length with patient and her parents  Reassurance provided re: recommendations to await 38 weeks for delivery per [facility] recommendations  Patient is going immediately to previously scheduled PNT after this visit - discussed that she should report to PNT but to alert them of decreased fetal movement  Discussed that she can take 1g of Tylenol to see if headache is alleviated, if not, to report to [facility]  Discussed that she could receive hydration and treatment for headache in [facility] immediately after PNT -- on call midwife notified that patient may go to [facility] after visit  Reviewed GBS neg and no need for intrapartum abx  Reviewed repeat 3rd trimester HIV- HIV neg, TPA not done - patient will have drawn when presenting for IOL  Education regarding labor precautions.  Reviewed warning signs, normal FM, and how/when to call.      Follow-up: 1 week, call or present sooner ELI Harmon CNM/KIMBERLEE

## 2024-05-16 NOTE — DISCHARGE INSTRUCTIONS
Call clinic [phone] or L & D after hours at [phone] for vaginal bleeding, leakage of fluids, contractions 4-5 in one hour, decreased fetal movements ( 10 kicks in 2 hours), headache not relieved by Tylenol, blurry vision, or temp of 100.4 or greater.  Begin doing fetal kick counts, at least 10 movements in 2 hours starting at 28 weeks gestation.  Keep next clinic appointment

## 2024-05-16 NOTE — ED PROVIDER NOTES
"Encounter Date: 2024       History     Chief Complaint   Patient presents with    Headache     Cole Jackson is a 23 y.o. female  at 37w4d with Estimated Date of Delivery: 24 who presents for evaluation following antepartum testing. C/O persistent headache (has not taken Tylenol since ) and baby not moving as much since  as well. Denies visual changes, epigastric pain or edema to hands/face or feet. No c/o cramps/contractions, lof/brvb, dysuria, fever/chills. Additionally reports decreased appetite She has been drinking "lots of body armour and Gatorade" with no issues tolerating. Here with her father.   FGR 12% diagnosed last week, AC 6%. EFW # 5 lb 12 oz. Normal doppler studies. Reactive NST today in AP testing with some mild variables. BPP 8/. She is scheduled to follow with them tomorrow and has IOL scheduled for 24.    This pregnancy has been complicated by   Patient Active Problem List:     Fetal arrhythmia affecting pregnancy, antepartum/resolved     Pregnancy     Fetal growth restriction    Birth Center Risk Assessment: 1-management on labor and Delivery    CNM management in ABC  CNM management on L&D  Consultation with OB to develop  plan of care  Collaborative CNM/OB management with delivery on L&D   4-   Permanent referral of care to MD         Review of patient's allergies indicates:   Allergen Reactions    Peanut Itching and Rash     Only when eaten - itching throat and eyes, but subsides with benadryl; epipen     No past medical history on file.  Past Surgical History:   Procedure Laterality Date    TONSILLECTOMY       Family History   Problem Relation Name Age of Onset    No Known Problems Paternal Grandmother      No Known Problems Father      No Known Problems Brother      Ovarian cancer Neg Hx      Diabetes Neg Hx       Social History     Tobacco Use    Smoking status: Never     Passive exposure: Never    Smokeless tobacco: Never   Substance Use Topics    " Alcohol use: No    Drug use: Never     Review of Systems   Constitutional:  Positive for appetite change. Negative for fever.   HENT: Negative.  Negative for sore throat.    Respiratory:  Negative for shortness of breath.    Cardiovascular:  Negative for chest pain and palpitations.   Gastrointestinal:  Negative for abdominal pain, constipation, diarrhea, nausea and vomiting.   Genitourinary:  Negative for dysuria, flank pain, vaginal bleeding and vaginal discharge.   Musculoskeletal:  Negative for back pain.   Skin:  Negative for rash.   Neurological:  Positive for headaches. Negative for weakness and light-headedness.   Hematological:  Does not bruise/bleed easily.   Psychiatric/Behavioral: Negative.         Physical Exam     Initial Vitals   BP Pulse Resp Temp SpO2   05/16/24 1118 05/16/24 1117 -- -- 05/16/24 1117   107/63 70   100 %      MAP       --              /69   Pulse 67   LMP 08/27/2023   SpO2 100%   B/P 109/71    Physical Exam    Constitutional: She appears well-developed and well-nourished. She is not diaphoretic. No distress.   HENT:   Head: Normocephalic and atraumatic.   Eyes: EOM are normal. Pupils are equal, round, and reactive to light.   Neck: Neck supple.   Normal range of motion.  Cardiovascular:  Normal rate and regular rhythm.           Pulmonary/Chest: Breath sounds normal.   Abdominal: Abdomen is soft. She exhibits no distension. There is no rebound and no guarding.   Genitourinary:    No vaginal discharge.     Musculoskeletal:         General: Normal range of motion.      Cervical back: Normal range of motion and neck supple.     Neurological: She is alert and oriented to person, place, and time. She has normal reflexes.   Skin: Skin is warm and dry. Capillary refill takes less than 2 seconds. No rash noted.   Psychiatric: She has a normal mood and affect. Her behavior is normal. Judgment and thought content normal.         ED Course   Fetal non-stress test    Date/Time:  "5/16/2024 6:40 PM    Performed by: Clarissa Jackson MD  Authorized by: Clarissa Jackson MD    Nonstress Test:     Variability:  6-25 BPM    Decelerations:  None    Accelerations:  15 bpm    Baseline:  140    Contractions:  Not present  Biophysical Profile:     Nonstress Test Interpretation: reactive      Overall Impression:  Reassuring    Labs Reviewed   CBC W/ AUTO DIFFERENTIAL - Abnormal; Notable for the following components:       Result Value    RBC 3.95 (*)     Hemoglobin 11.8 (*)     Hematocrit 35.9 (*)     Immature Granulocytes 3.1 (*)     Immature Grans (Abs) 0.33 (*)     Lymph % 17.9 (*)     All other components within normal limits   COMPREHENSIVE METABOLIC PANEL - Abnormal; Notable for the following components:    CO2 21 (*)     BUN 4 (*)     Albumin 2.6 (*)     Alkaline Phosphatase 249 (*)     All other components within normal limits   PROTEIN / CREATININE RATIO, URINE    Narrative:     Specimen Source->Urine          Imaging Results    None          Medications - No data to display    Medical Decision Making  A reactive NST was obtained with no decels. No contractions noted or reported by patient. CAT 1 fetal tracing. EFM/TOCO removed.  Unremarkable physical exam.   VSS-normotensive  PIH labs ordered and pending  Continue to po hydrate  Tylenol 1 gm po x 1 given  Discussed with patient and [facility] Attending, Dr. Jackson. If improvement in headache and normal PIH labs. Plan to d/c home with strict FMC, PIH precautions and f/u tomorrow as scheduled for repeat AP testing. IOL moved to 5/19/22 at 1600/4 pm (38 weeks vs 38+ 2/7 weeks). Patient is happy with this plan and appear to understand well all we discussed.    UPDATE:  Remains normotensive.   Headache improved with Tylenol "I am hungry".  Has noted fetal movement while present in [facility].  Normal CMP, CMB and urine dip (no protein, ketones, normal SG). Protein/creatinine ration pending.   Consulted with Dr. Jackson, [facility] GU[facility] Attending: D/C to home with f/u as " planned/scheduled (Prenatal testing tomorrow). IOL moved to 5/19/24 at 1600. Patient aware. Method of induction to be determined after admission. Strict FMC, S&S of pre eclampsia reinforced.     Amount and/or Complexity of Data Reviewed  Independent Historian: spouse  External Data Reviewed: labs and notes.  Labs: ordered.     Details: CBC, CMP, UA, Urine Dip, Protein/creatinine ratio    Risk  OTC drugs.              Attending Attestation:   Physician Attestation Statement for Resident:  As the supervising MD   Physician Attestation Statement: I have personally seen and examined this patient.   I agree with the above history.  -:   As the supervising MD I agree with the above PE.     As the supervising MD I agree with the above treatment, course, plan, and disposition.   -: Cat 1 tracing. HA resolved. Normal pre E labs with normal BP while in [facility]. Reassurance provided CNM moved up IOL to 38w0d.  FKC reviewed.  I was personally present during the critical portions of the procedure(s) performed by the resident and was immediately available in the ED to provide services and assistance as needed during the entire procedure.  I have reviewed and agree with the residents interpretation of the following: lab data.                                        Clinical Impression:  Final diagnoses:  [O26.899, R51.9] Headache in pregnancy, antepartum (Primary)  [O36.8131] Decreased fetal movement affecting management of pregnancy in third trimester, fetus 1  [O36.5990] Fetal growth restriction antepartum  [Z3A.34] 34 weeks gestation of pregnancy  IUP at 37+ 4/7 weeks, Reactive NST. Improving headache, no S&S or evidence of of pre eclampsia. FGR, stable with reactive NST and normal BPP.  Reassuring maternal-fetal status.   ED Disposition Condition    Discharge Stable          ED Prescriptions    None       Follow-up Information    None          Carmelina Lee CNM  05/16/24 [id number]       Carmelina Lee CNM  05/16/24  1734       Carmelina Lee, LUCERO  05/16/24 1731       Clarissa Jackson MD  05/16/24 4816

## 2024-05-17 ENCOUNTER — HOSPITAL ENCOUNTER (OUTPATIENT)
Dept: PERINATAL CARE | Facility: OTHER | Age: 24
Discharge: HOME | End: 2024-05-17
Payer: MEDICAID

## 2024-05-17 DIAGNOSIS — O36.5990 FETAL GROWTH RESTRICTION ANTEPARTUM: ICD-10-CM

## 2024-05-17 PROCEDURE — 59025 FETAL NON-STRESS TEST: CPT | Mod: 26,,, | Performed by: OBSTETRICS & GYNECOLOGY

## 2024-05-17 PROCEDURE — 59025 FETAL NON-STRESS TEST: CPT

## 2024-05-19 ENCOUNTER — ANESTHESIA EVENT (OUTPATIENT)
Dept: OBSTETRICS AND GYNECOLOGY | Facility: OTHER | Age: 24
End: 2024-05-19
Payer: MEDICAID

## 2024-05-19 ENCOUNTER — HOSPITAL ENCOUNTER (INPATIENT)
Facility: OTHER | Age: 24
LOS: 3 days | Discharge: HOME | End: 2024-05-22
Attending: OBSTETRICS & GYNECOLOGY | Admitting: OBSTETRICS & GYNECOLOGY
Payer: MEDICAID

## 2024-05-19 ENCOUNTER — ANESTHESIA (OUTPATIENT)
Dept: OBSTETRICS AND GYNECOLOGY | Facility: OTHER | Age: 24
End: 2024-05-19
Payer: MEDICAID

## 2024-05-19 ENCOUNTER — TELEPHONE (OUTPATIENT)
Dept: OBSTETRICS AND GYNECOLOGY | Facility: HOSPITAL | Age: 24
End: 2024-05-19
Payer: MEDICAID

## 2024-05-19 DIAGNOSIS — O36.5990 FETAL GROWTH RESTRICTION ANTEPARTUM: ICD-10-CM

## 2024-05-19 PROBLEM — O36.8131 DECREASED FETAL MOVEMENT AFFECTING MANAGEMENT OF PREGNANCY IN THIRD TRIMESTER, FETUS 1: Status: RESOLVED | Noted: 2024-05-16 | Resolved: 2024-05-19

## 2024-05-19 PROBLEM — Z34.90 ENCOUNTER FOR PLANNED INDUCTION OF LABOR: Status: ACTIVE | Noted: 2024-05-19

## 2024-05-19 PROBLEM — O26.899 HEADACHE IN PREGNANCY, ANTEPARTUM: Status: RESOLVED | Noted: 2024-05-16 | Resolved: 2024-05-19

## 2024-05-19 PROBLEM — R51.9 HEADACHE IN PREGNANCY, ANTEPARTUM: Status: RESOLVED | Noted: 2024-05-16 | Resolved: 2024-05-19

## 2024-05-19 LAB
ABO + RH BLD: NORMAL
BASOPHILS # BLD AUTO: 0.06 K/UL (ref 0–0.2)
BASOPHILS NFR BLD: 0.5 % (ref 0–1.9)
BLD GP AB SCN CELLS X3 SERPL QL: NORMAL
DIFFERENTIAL METHOD BLD: ABNORMAL
EOSINOPHIL # BLD AUTO: 0.2 K/UL (ref 0–0.5)
EOSINOPHIL NFR BLD: 1.6 % (ref 0–8)
ERYTHROCYTE [DISTWIDTH] IN BLOOD BY AUTOMATED COUNT: 12.4 % (ref 11.5–14.5)
HCT VFR BLD AUTO: 38.8 % (ref 37–48.5)
HGB BLD-MCNC: 12.9 G/DL (ref 12–16)
IMM GRANULOCYTES # BLD AUTO: 0.26 K/UL (ref 0–0.04)
IMM GRANULOCYTES NFR BLD AUTO: 2.2 % (ref 0–0.5)
LYMPHOCYTES # BLD AUTO: 1.9 K/UL (ref 1–4.8)
LYMPHOCYTES NFR BLD: 15.8 % (ref 18–48)
MCH RBC QN AUTO: 29.9 PG (ref 27–31)
MCHC RBC AUTO-ENTMCNC: 33.2 G/DL (ref 32–36)
MCV RBC AUTO: 90 FL (ref 82–98)
MONOCYTES # BLD AUTO: 1 K/UL (ref 0.3–1)
MONOCYTES NFR BLD: 8.3 % (ref 4–15)
NEUTROPHILS # BLD AUTO: 8.6 K/UL (ref 1.8–7.7)
NEUTROPHILS NFR BLD: 71.6 % (ref 38–73)
NRBC BLD-RTO: 0 /100 WBC
PLATELET # BLD AUTO: 248 K/UL (ref 150–450)
PMV BLD AUTO: 11.2 FL (ref 9.2–12.9)
RBC # BLD AUTO: 4.32 M/UL (ref 4–5.4)
TREPONEMA PALLIDUM IGG+IGM AB [PRESENCE] IN SERUM OR PLASMA BY IMMUNOASSAY: NONREACTIVE
WBC # BLD AUTO: 11.93 K/UL (ref 3.9–12.7)

## 2024-05-19 PROCEDURE — 63600175 PHARM REV CODE 636 W HCPCS

## 2024-05-19 PROCEDURE — 10907ZC DRAINAGE OF AMNIOTIC FLUID, THERAPEUTIC FROM PRODUCTS OF CONCEPTION, VIA NATURAL OR ARTIFICIAL OPENING: ICD-10-PCS | Performed by: OBSTETRICS & GYNECOLOGY

## 2024-05-19 PROCEDURE — 62326 NJX INTERLAMINAR LMBR/SAC: CPT | Performed by: STUDENT IN AN ORGANIZED HEALTH CARE EDUCATION/TRAINING PROGRAM

## 2024-05-19 PROCEDURE — C1751 CATH, INF, PER/CENT/MIDLINE: HCPCS | Performed by: ANESTHESIOLOGY

## 2024-05-19 PROCEDURE — 51702 INSERT TEMP BLADDER CATH: CPT

## 2024-05-19 PROCEDURE — 25000003 PHARM REV CODE 250: Performed by: STUDENT IN AN ORGANIZED HEALTH CARE EDUCATION/TRAINING PROGRAM

## 2024-05-19 PROCEDURE — 85025 COMPLETE CBC W/AUTO DIFF WBC: CPT

## 2024-05-19 PROCEDURE — 27200710 HC EPIDURAL INFUSION PUMP SET: Performed by: ANESTHESIOLOGY

## 2024-05-19 PROCEDURE — 63600175 PHARM REV CODE 636 W HCPCS: Performed by: OBSTETRICS & GYNECOLOGY

## 2024-05-19 PROCEDURE — 59409 OBSTETRICAL CARE: CPT | Mod: AA,,, | Performed by: ANESTHESIOLOGY

## 2024-05-19 PROCEDURE — 86593 SYPHILIS TEST NON-TREP QUANT: CPT

## 2024-05-19 PROCEDURE — 3E033VJ INTRODUCTION OF OTHER HORMONE INTO PERIPHERAL VEIN, PERCUTANEOUS APPROACH: ICD-10-PCS | Performed by: OBSTETRICS & GYNECOLOGY

## 2024-05-19 PROCEDURE — 86901 BLOOD TYPING SEROLOGIC RH(D): CPT

## 2024-05-19 PROCEDURE — 11000001 HC ACUTE MED/SURG PRIVATE ROOM

## 2024-05-19 RX ORDER — OXYTOCIN/RINGER'S LACTATE 30/500 ML
95 PLASTIC BAG, INJECTION (ML) INTRAVENOUS ONCE AS NEEDED
Status: DISCONTINUED | OUTPATIENT
Start: 2024-05-19 | End: 2024-05-20

## 2024-05-19 RX ORDER — SODIUM CHLORIDE, SODIUM LACTATE, POTASSIUM CHLORIDE, CALCIUM CHLORIDE 600; 310; 30; 20 MG/100ML; MG/100ML; MG/100ML; MG/100ML
INJECTION, SOLUTION INTRAVENOUS CONTINUOUS
Status: DISCONTINUED | OUTPATIENT
Start: 2024-05-19 | End: 2024-05-20

## 2024-05-19 RX ORDER — FAMOTIDINE 10 MG/ML
20 INJECTION INTRAVENOUS ONCE
Status: CANCELLED | OUTPATIENT
Start: 2024-05-19 | End: 2024-05-19

## 2024-05-19 RX ORDER — LIDOCAINE HYDROCHLORIDE AND EPINEPHRINE 15; 5 MG/ML; UG/ML
INJECTION, SOLUTION EPIDURAL
Status: DISCONTINUED | OUTPATIENT
Start: 2024-05-19 | End: 2024-05-20

## 2024-05-19 RX ORDER — CALCIUM CARBONATE 200(500)MG
500 TABLET,CHEWABLE ORAL 3 TIMES DAILY PRN
Status: DISCONTINUED | OUTPATIENT
Start: 2024-05-19 | End: 2024-05-20

## 2024-05-19 RX ORDER — DIPHENOXYLATE HYDROCHLORIDE AND ATROPINE SULFATE 2.5; .025 MG/1; MG/1
2 TABLET ORAL EVERY 6 HOURS PRN
Status: DISCONTINUED | OUTPATIENT
Start: 2024-05-19 | End: 2024-05-20

## 2024-05-19 RX ORDER — LIDOCAINE HYDROCHLORIDE 10 MG/ML
10 INJECTION INFILTRATION; PERINEURAL ONCE AS NEEDED
Status: DISCONTINUED | OUTPATIENT
Start: 2024-05-19 | End: 2024-05-20

## 2024-05-19 RX ORDER — MISOPROSTOL 200 UG/1
800 TABLET ORAL ONCE AS NEEDED
Status: DISCONTINUED | OUTPATIENT
Start: 2024-05-19 | End: 2024-05-20

## 2024-05-19 RX ORDER — FENTANYL/BUPIVACAINE/NS/PF 2MCG/ML-.1
PLASTIC BAG, INJECTION (ML) INJECTION CONTINUOUS PRN
Status: DISCONTINUED | OUTPATIENT
Start: 2024-05-19 | End: 2024-05-20

## 2024-05-19 RX ORDER — SODIUM CHLORIDE 9 MG/ML
INJECTION, SOLUTION INTRAVENOUS
Status: DISCONTINUED | OUTPATIENT
Start: 2024-05-19 | End: 2024-05-20

## 2024-05-19 RX ORDER — CARBOPROST TROMETHAMINE 250 UG/ML
250 INJECTION, SOLUTION INTRAMUSCULAR
Status: DISCONTINUED | OUTPATIENT
Start: 2024-05-19 | End: 2024-05-20

## 2024-05-19 RX ORDER — ONDANSETRON HYDROCHLORIDE 2 MG/ML
4 INJECTION, SOLUTION INTRAVENOUS ONCE
Status: CANCELLED | OUTPATIENT
Start: 2024-05-19 | End: 2024-05-19

## 2024-05-19 RX ORDER — DIPHENHYDRAMINE HYDROCHLORIDE 50 MG/ML
12.5 INJECTION INTRAMUSCULAR; INTRAVENOUS EVERY 4 HOURS PRN
Status: CANCELLED | OUTPATIENT
Start: 2024-05-19

## 2024-05-19 RX ORDER — FENTANYL/BUPIVACAINE/NS/PF 2MCG/ML-.1
PLASTIC BAG, INJECTION (ML) INJECTION CONTINUOUS
Status: CANCELLED | OUTPATIENT
Start: 2024-05-19

## 2024-05-19 RX ORDER — TRANEXAMIC ACID 10 MG/ML
1000 INJECTION, SOLUTION INTRAVENOUS EVERY 30 MIN PRN
Status: DISCONTINUED | OUTPATIENT
Start: 2024-05-19 | End: 2024-05-20

## 2024-05-19 RX ORDER — NALBUPHINE HYDROCHLORIDE 10 MG/ML
2.5 INJECTION, SOLUTION INTRAMUSCULAR; INTRAVENOUS; SUBCUTANEOUS ONCE
Status: CANCELLED | OUTPATIENT
Start: 2024-05-19 | End: 2024-05-19

## 2024-05-19 RX ORDER — SIMETHICONE 80 MG
1 TABLET,CHEWABLE ORAL 4 TIMES DAILY PRN
Status: DISCONTINUED | OUTPATIENT
Start: 2024-05-19 | End: 2024-05-20

## 2024-05-19 RX ORDER — OXYTOCIN 10 [USP'U]/ML
10 INJECTION, SOLUTION INTRAMUSCULAR; INTRAVENOUS ONCE AS NEEDED
Status: DISCONTINUED | OUTPATIENT
Start: 2024-05-19 | End: 2024-05-20

## 2024-05-19 RX ORDER — SODIUM CITRATE AND CITRIC ACID MONOHYDRATE 334; 500 MG/5ML; MG/5ML
30 SOLUTION ORAL ONCE
Status: CANCELLED | OUTPATIENT
Start: 2024-05-19 | End: 2024-05-19

## 2024-05-19 RX ORDER — ONDANSETRON 8 MG/1
8 TABLET, ORALLY DISINTEGRATING ORAL EVERY 8 HOURS PRN
Status: DISCONTINUED | OUTPATIENT
Start: 2024-05-19 | End: 2024-05-20

## 2024-05-19 RX ORDER — FENTANYL/BUPIVACAINE/NS/PF 2MCG/ML-.1
PLASTIC BAG, INJECTION (ML) INJECTION
Status: COMPLETED
Start: 2024-05-19 | End: 2024-05-19

## 2024-05-19 RX ORDER — OXYTOCIN/RINGER'S LACTATE 30/500 ML
334 PLASTIC BAG, INJECTION (ML) INTRAVENOUS ONCE
Status: DISCONTINUED | OUTPATIENT
Start: 2024-05-19 | End: 2024-05-20

## 2024-05-19 RX ORDER — OXYTOCIN/RINGER'S LACTATE 30/500 ML
0-32 PLASTIC BAG, INJECTION (ML) INTRAVENOUS CONTINUOUS
Status: DISCONTINUED | OUTPATIENT
Start: 2024-05-19 | End: 2024-05-20

## 2024-05-19 RX ORDER — METHYLERGONOVINE MALEATE 0.2 MG/ML
200 INJECTION INTRAVENOUS ONCE AS NEEDED
Status: DISCONTINUED | OUTPATIENT
Start: 2024-05-19 | End: 2024-05-20

## 2024-05-19 RX ORDER — OXYTOCIN/RINGER'S LACTATE 30/500 ML
95 PLASTIC BAG, INJECTION (ML) INTRAVENOUS ONCE
Status: DISCONTINUED | OUTPATIENT
Start: 2024-05-19 | End: 2024-05-20

## 2024-05-19 RX ORDER — OXYTOCIN/RINGER'S LACTATE 30/500 ML
334 PLASTIC BAG, INJECTION (ML) INTRAVENOUS ONCE AS NEEDED
Status: COMPLETED | OUTPATIENT
Start: 2024-05-19 | End: 2024-05-20

## 2024-05-19 RX ADMIN — Medication 8 ML/HR: at 10:05

## 2024-05-19 RX ADMIN — Medication 4 MILLI-UNITS/MIN: at 01:05

## 2024-05-19 RX ADMIN — SODIUM CHLORIDE, POTASSIUM CHLORIDE, SODIUM LACTATE AND CALCIUM CHLORIDE: 600; 310; 30; 20 INJECTION, SOLUTION INTRAVENOUS at 01:05

## 2024-05-19 RX ADMIN — LIDOCAINE HYDROCHLORIDE,EPINEPHRINE BITARTRATE 3 ML: 15; .005 INJECTION, SOLUTION EPIDURAL; INFILTRATION; INTRACAUDAL; PERINEURAL at 10:05

## 2024-05-19 NOTE — HOSPITAL COURSE
SVE 1.5/60/-2 @ 1300, rich placed without difficulty  High dose pitocin discussed with physician team and ordered  Standard IOL labs (CBC, T&S, TPA)  Desires unmedicated birth, open to nitrous, discussed no IV pain medications in labor and in light of FGR  Hydrotherapy PRN in active labor  Recheck in 4 hours or sooner PRN, AROM when feasible    5/21/24 PPD #1, doing ok. Struggling with breastfeeding.   5/22/24 PPD2 - doing well today, normal lochia, pain controlled with PO meds. Breastfeeding still painful, working with lactation. Discharge home today.

## 2024-05-19 NOTE — HPI
Cole Jackson is a 23 y.o. female  at 38w0d with Estimated Date of Delivery: 24 based on 8 wk sono who presents to Labor and Delivery accompanied by her mother and father. Expecting a boy- [place]!    Mild contractions, active fetal movement, no vaginal bleeding, no loss of fluid.     This pregnancy has been complicated by FGR (EFW 12%, AC 6%, 2619g at 37+1)     Presentation: Cephalic by BSUS  Estimated Fetal Weight: 6 lb    Birth Center Risk Assessment: 1-management on labor and Delivery    CNM management in ABC  CNM management on L&D  Consultation with OB to develop  plan of care  Collaborative CNM/OB management with delivery on L&D   4-   Permanent referral of care to MD

## 2024-05-19 NOTE — SUBJECTIVE & OBJECTIVE
Obstetric HPI:  Patient reports mild, irregular contractions, active fetal movement, No vaginal bleeding, No loss of fluid     This pregnancy has been complicated by FGR    OB History    Para Term  AB Living   2 0 0 0 1 0   SAB IAB Ectopic Multiple Live Births   0 0 0 0 0      # Outcome Date GA Lbr Braden/2nd Weight Sex Type Anes PTL Lv   2 Current            1 AB 2021 6w0d    SAB        No past medical history on file.  Past Surgical History:   Procedure Laterality Date    TONSILLECTOMY         PTA Medications   Medication Sig    cetirizine (ZYRTEC) 10 MG tablet Take 1 tablet (10 mg total) by mouth once daily.    Lactobacillus acidophilus (PROBIOTIC ORAL) Take by mouth.    nystatin (MYCOSTATIN) cream Apply topically 2 (two) times daily. for 7 days    PNV no.95/ferrous fum/folic ac (PRENATAL ORAL) Take by mouth.       Review of patient's allergies indicates:   Allergen Reactions    Peanut Itching and Rash     Only when eaten - itching throat and eyes, but subsides with benadryl; epipen        Family History       Problem Relation (Age of Onset)    No Known Problems Paternal Grandmother, Father, Brother          Tobacco Use    Smoking status: Never     Passive exposure: Never    Smokeless tobacco: Never   Substance and Sexual Activity    Alcohol use: No    Drug use: Never    Sexual activity: Yes     Birth control/protection: Coitus interruptus     Review of Systems   Eyes:  Negative for visual disturbance.   Gastrointestinal:  Negative for abdominal pain.   Genitourinary:  Negative for vaginal bleeding and vaginal discharge.   Neurological:  Negative for headaches.   Psychiatric/Behavioral:  The patient is not nervous/anxious.       Objective:     Vital Signs (Most Recent):    Vital Signs (24h Range):           There is no height or weight on file to calculate BMI.    FHT: 150 Cat 1 (reassuring)  TOCO:  Q 2-4 minutes     Physical Exam:   Constitutional: She is oriented to person, place, and time.  She appears well-developed.    HENT:   Head: Normocephalic and atraumatic.    Eyes: Conjunctivae are normal.     Cardiovascular:  Normal rate.             Pulmonary/Chest: No respiratory distress.        Abdominal: Soft.   Mild contractions     Genitourinary:    Vagina normal.             Musculoskeletal: Normal range of motion.       Neurological: She is alert and oriented to person, place, and time.    Skin: Skin is warm and dry.    Psychiatric: She has a normal mood and affect. Her behavior is normal. Judgment and thought content normal.        Cervix:  Dilation:  1.5  Effacement:  60 , soft, midposition  Station: -2  Presentation: Vertex     Significant Labs:  Lab Results   Component Value Date    GROUPTRH B POS 12/09/2020    STREPBCULT No Group B Streptococcus isolated 05/08/2024       I have personallly reviewed all pertinent lab results from the last 24 hours.  Recent Lab Results         05/19/24  1300        Baso # 0.06       Basophil % 0.5       Differential Method Automated       Eos # 0.2       Eos % 1.6       Gran # (ANC) 8.6       Gran % 71.6       Hematocrit 38.8       Hemoglobin 12.9       Immature Grans (Abs) 0.26  Comment: Mild elevation in immature granulocytes is non specific and   can be seen in a variety of conditions including stress response,   acute inflammation, trauma and pregnancy. Correlation with other   laboratory and clinical findings is essential.         Immature Granulocytes 2.2       Lymph # 1.9       Lymph % 15.8       MCH 29.9       MCHC 33.2       MCV 90       Mono # 1.0       Mono % 8.3       MPV 11.2       nRBC 0       Platelet Count 248       RBC 4.32       RDW 12.4       WBC 11.93

## 2024-05-19 NOTE — PROGRESS NOTES
"LABOR NOTE    S:  Pt bouncing on birthing ball, endorses 5/10 ctx pain.  Family at bedside and supportive.     O: /65   Pulse 87   Temp 97.8 °F (36.6 °C) (Oral)   Resp 17   Ht 5' 6" (1.676 m)   Wt 95.7 kg (210 lb 15.7 oz)   LMP 2023   SpO2 99%   Breastfeeding No   BMI 34.05 kg/m²     GENERAL: Calm and appropriate affect  NEURO: Alert, oriented, normal speech  ABDOMEN: Nontender, Fundus palpates soft between UC's.  FHT: Baseline 140, moderate variability, positive accels, no decels. Cat 1, reassuring.  CTX: q 2 minutes  SVE: 5/70/-2, AROM clear      ASSESSMENT:   23 y.o.  IUP at 38w0d, FHT reassuring/ Cat 1    Patient Active Problem List   Diagnosis    Fetal arrhythmia affecting pregnancy, antepartum    Pregnancy    Fetal growth restriction antepartum    Encounter for planned induction of labor         PLAN:  Continue close Maternal/Fetal monitoring  Pitocin Augmentation per protocol - currently infusing at 12mu  Recheck 2 hours or PRN  Epidural per anesthesia at pt's request  Hydrotherapy at pt's request      "

## 2024-05-19 NOTE — ASSESSMENT & PLAN NOTE
SVE 1.5/60/-2 @ 1300, rich placed without difficulty  High dose pitocin discussed with physician team and ordered

## 2024-05-19 NOTE — PROGRESS NOTES
LABOR NOTE    S:  Pt ambulating, endorses mild pain with contractions.  Family at bedside and supportive.     O: /76   Pulse 69   LMP 2023   SpO2 99%   Breastfeeding No     GENERAL: Calm and appropriate affect  NEURO: Alert, oriented, normal speech  ABDOMEN: Nontender, Fundus palpates soft between UC's.  FHT: Baseline 140, moderate variability, positive accels, no decels. Cat 1, reassuring.  CTX: q 2-3 minutes  SVE: 5/70/-2, CRB removed with gentle traction      ASSESSMENT:   23 y.o.  IUP at 38w0d, FHT reassuring/ Cat 1    Patient Active Problem List   Diagnosis    Fetal arrhythmia affecting pregnancy, antepartum    Pregnancy    Fetal growth restriction antepartum    Encounter for planned induction of labor         PLAN:  Continue close Maternal/Fetal monitoring  Pitocin Augmentation per protocol - currently infusing at 4mu  Recheck 2 hours or PRN  Epidural per anesthesia at pt's request  Hydrotherapy PRN per patient request  AROM at next exam if unchanged

## 2024-05-19 NOTE — ANESTHESIA PREPROCEDURE EVALUATION
[facility]  Anesthesia Pre-Operative Evaluation         Patient Name: Cole Jackson  YOB: 2000  MRN: [id number]    2024      Cole Jackson is a 23 y.o. female  with IUP @ 38w0d who presents for scheduled IOL. IUP has been complicated by FGR.     Denies previous issues with general anesthesia.    Denies family history of issues with anesthesia.     She otherwise has no noted bleeding/clotting disorder, significant cardiopulmonary disease or spinal pathology.      OB History    Para Term  AB Living   2       1     SAB IAB Ectopic Multiple Live Births                  # Outcome Date GA Lbr Braden/2nd Weight Sex Type Anes PTL Lv   2 Current            1 AB 2021 6w0d    SAB          Review of patient's allergies indicates:   Allergen Reactions    Peanut Itching and Rash     Only when eaten - itching throat and eyes, but subsides with benadryl; epipen       Wt Readings from Last 1 Encounters:   24 0817 95.7 kg (210 lb 14.3 oz)       BP Readings from Last 3 Encounters:   24 117/76   24 116/69   24 118/67       Patient Active Problem List   Diagnosis    Fetal arrhythmia affecting pregnancy, antepartum    Pregnancy    Fetal growth restriction antepartum    Encounter for planned induction of labor       Past Surgical History:   Procedure Laterality Date    TONSILLECTOMY  2013       Social History     Socioeconomic History    Marital status: Single    Highest education level: Bachelor's degree (e.g., BA, AB, BS)   Tobacco Use    Smoking status: Never     Passive exposure: Never    Smokeless tobacco: Never   Substance and Sexual Activity    Alcohol use: No    Drug use: Never    Sexual activity: Yes     Birth control/protection: Coitus interruptus   Social History Narrative    Living with her mom and dad and younger sister.    Knows FOB - he lives in [place]; they are no longer in relationship - he has two other children     Social Determinants of  "Health     Financial Resource Strain: Medium Risk (10/6/2023)    [facility]    Financial Resource Strain     Scoring Flowsheet: 4   Food Insecurity: Medium Risk (10/6/2023)    [facility]    Food Insecurity     FOOD INSECURITY SCORE: Never   Transportation Needs: Not At Risk (10/6/2023)    [facility]    Transportation Needs     In the past 12 months has lack of reliable transportation kept you from medical appointments, meetings, work or from getting to things needed for daily living?: No         Chemistry        Component Value Date/Time     05/16/2024 1202    K 4.1 05/16/2024 1202     05/16/2024 1202    CO2 21 (L) 05/16/2024 1202    BUN 4 (L) 05/16/2024 1202    CREATININE 0.6 05/16/2024 1202    GLU 73 05/16/2024 1202        Component Value Date/Time    CALCIUM 8.8 05/16/2024 1202    ALKPHOS 249 (H) 05/16/2024 1202    AST 28 05/16/2024 1202    ALT 33 05/16/2024 1202    BILITOT 0.2 05/16/2024 1202    ESTGFRAFRICA >60 12/09/2020 2201    EGFRNONAA >60 12/09/2020 2201            Lab Results   Component Value Date    WBC 11.93 05/19/2024    HGB 12.9 05/19/2024    HCT 38.8 05/19/2024    MCV 90 05/19/2024     05/19/2024       No results for input(s): "PT", "INR", "PROTIME", "APTT" in the last 72 hours.          Pre-op Assessment    I have reviewed the Patient Summary Reports.     I have reviewed the Nursing Notes. I have reviewed the NPO Status.   I have reviewed the Medications.     Review of Systems  Anesthesia Hx:  No problems with previous Anesthesia               Denies Personal Hx of Anesthesia complications.                    Social:  Non-Smoker       Hematology/Oncology:  Hematology Normal   Oncology Normal                                   EENT/Dental:  EENT/Dental Normal         "   Cardiovascular:  Cardiovascular Normal                                            Pulmonary:  Pulmonary Normal                       Renal/:  Renal/ Normal                 Hepatic/GI:  Hepatic/GI Normal                 Musculoskeletal:  Musculoskeletal Normal                Neurological:  Neurology Normal                                      Endocrine:  Endocrine Normal            Dermatological:  Skin Normal    Psych:  Psychiatric Normal                    Physical Exam  General: Well nourished, Cooperative and Alert    Airway:  Mallampati: II   Mouth Opening: Normal  TM Distance: Normal  Tongue: Normal  Neck ROM: Normal ROM    Dental:  Intact        Anesthesia Plan  Type of Anesthesia, risks & benefits discussed:    Anesthesia Type: Gen ETT, Spinal, CSE, Epidural  Intra-op Monitoring Plan: Standard ASA Monitors  Post Op Pain Control Plan: multimodal analgesia and IV/PO Opioids PRN  Induction:  IV  Airway Plan: Video, Post-Induction  Informed Consent: Informed consent signed with the Patient and all parties understand the risks and agree with anesthesia plan.  All questions answered. Patient consented to blood products? Yes  ASA Score: 2  Day of Surgery Review of History & Physical: H&P Update referred to the surgeon/provider.I have interviewed and examined the patient. I have reviewed the patient's H&P dated:     Ready For Surgery From Anesthesia Perspective.     .

## 2024-05-19 NOTE — H&P
[facility] - Labor & Delivery  Obstetrics  History & Physical    Patient Name: Cole Jackson  MRN: [id number]  Admission Date: 2024  Primary Care Provider: Evert Pearson MD    Subjective:     Principal Problem:Encounter for planned induction of labor    History of Present Illness:  Cole Jackson is a 23 y.o. female  at 38w0d with Estimated Date of Delivery: 24 based on 8 wk sono who presents to Labor and Delivery accompanied by her mother and father. Expecting a boy- [place]!    Mild contractions, active fetal movement, no vaginal bleeding, no loss of fluid.     This pregnancy has been complicated by FGR (EFW 12%, AC 6%, 2619g at 37+1)     Presentation: Cephalic by BSUS  Estimated Fetal Weight: 6 lb    Birth Center Risk Assessment: 1-management on labor and Delivery    CNM management in ABC  CNM management on L&D  Consultation with OB to develop  plan of care  Collaborative CNM/OB management with delivery on L&D   4-   Permanent referral of care to MD      Obstetric HPI:  Patient reports mild, irregular contractions, active fetal movement, No vaginal bleeding, No loss of fluid     This pregnancy has been complicated by FGR    OB History    Para Term  AB Living   2 0 0 0 1 0   SAB IAB Ectopic Multiple Live Births   0 0 0 0 0      # Outcome Date GA Lbr Braden/2nd Weight Sex Type Anes PTL Lv   2 Current            1 AB 2021 6w0d    SAB        No past medical history on file.  Past Surgical History:   Procedure Laterality Date    TONSILLECTOMY         PTA Medications   Medication Sig    cetirizine (ZYRTEC) 10 MG tablet Take 1 tablet (10 mg total) by mouth once daily.    Lactobacillus acidophilus (PROBIOTIC ORAL) Take by mouth.    nystatin (MYCOSTATIN) cream Apply topically 2 (two) times daily. for 7 days    PNV no.95/ferrous fum/folic ac (PRENATAL ORAL) Take by mouth.       Review of patient's allergies indicates:   Allergen Reactions    Peanut Itching and Rash     Only when eaten -  itching throat and eyes, but subsides with benadryl; epipen        Family History       Problem Relation (Age of Onset)    No Known Problems Paternal Grandmother, Father, Brother          Tobacco Use    Smoking status: Never     Passive exposure: Never    Smokeless tobacco: Never   Substance and Sexual Activity    Alcohol use: No    Drug use: Never    Sexual activity: Yes     Birth control/protection: Coitus interruptus     Review of Systems   Eyes:  Negative for visual disturbance.   Gastrointestinal:  Negative for abdominal pain.   Genitourinary:  Negative for vaginal bleeding and vaginal discharge.   Neurological:  Negative for headaches.   Psychiatric/Behavioral:  The patient is not nervous/anxious.       Objective:     Vital Signs (Most Recent):    Vital Signs (24h Range):           There is no height or weight on file to calculate BMI.    FHT: 150 Cat 1 (reassuring)  TOCO:  Q 2-4 minutes     Physical Exam:   Constitutional: She is oriented to person, place, and time. She appears well-developed.    HENT:   Head: Normocephalic and atraumatic.    Eyes: Conjunctivae are normal.     Cardiovascular:  Normal rate.             Pulmonary/Chest: No respiratory distress.        Abdominal: Soft.   Mild contractions     Genitourinary:    Vagina normal.             Musculoskeletal: Normal range of motion.       Neurological: She is alert and oriented to person, place, and time.    Skin: Skin is warm and dry.    Psychiatric: She has a normal mood and affect. Her behavior is normal. Judgment and thought content normal.        Cervix:  Dilation:  1.5  Effacement:  60 , soft, midposition  Station: -2  Presentation: Vertex     Significant Labs:  Lab Results   Component Value Date    GROUPTRH B POS 12/09/2020    STREPBCULT No Group B Streptococcus isolated 05/08/2024       I have personallly reviewed all pertinent lab results from the last 24 hours.  Recent Lab Results         05/19/24  1300        Baso # 0.06       Basophil %  0.5       Differential Method Automated       Eos # 0.2       Eos % 1.6       Gran # (ANC) 8.6       Gran % 71.6       Hematocrit 38.8       Hemoglobin 12.9       Immature Grans (Abs) 0.26  Comment: Mild elevation in immature granulocytes is non specific and   can be seen in a variety of conditions including stress response,   acute inflammation, trauma and pregnancy. Correlation with other   laboratory and clinical findings is essential.         Immature Granulocytes 2.2       Lymph # 1.9       Lymph % 15.8       MCH 29.9       MCHC 33.2       MCV 90       Mono # 1.0       Mono % 8.3       MPV 11.2       nRBC 0       Platelet Count 248       RBC 4.32       RDW 12.4       WBC 11.93             Assessment/Plan:     23 y.o. female  at 38w0d for:    * Encounter for planned induction of labor  SVE 1.5/60/-2 @ 1300, rich placed without difficulty  High dose pitocin discussed with physician team and ordered    Fetal growth restriction antepartum  EFW 12%, AC 6%, 2619g at 37+1        Laura Landa CNM  Obstetrics  [facility] - Labor & Delivery

## 2024-05-20 PROBLEM — Z34.90 ENCOUNTER FOR PLANNED INDUCTION OF LABOR: Status: RESOLVED | Noted: 2024-05-19 | Resolved: 2024-05-20

## 2024-05-20 LAB
BASOPHILS # BLD AUTO: 0.06 K/UL (ref 0–0.2)
BASOPHILS NFR BLD: 0.3 % (ref 0–1.9)
DIFFERENTIAL METHOD BLD: ABNORMAL
EOSINOPHIL # BLD AUTO: 0 K/UL (ref 0–0.5)
EOSINOPHIL NFR BLD: 0.1 % (ref 0–8)
ERYTHROCYTE [DISTWIDTH] IN BLOOD BY AUTOMATED COUNT: 12.3 % (ref 11.5–14.5)
HCT VFR BLD AUTO: 34.4 % (ref 37–48.5)
HGB BLD-MCNC: 11.4 G/DL (ref 12–16)
IMM GRANULOCYTES # BLD AUTO: 0.24 K/UL (ref 0–0.04)
IMM GRANULOCYTES NFR BLD AUTO: 1.1 % (ref 0–0.5)
LYMPHOCYTES # BLD AUTO: 1.8 K/UL (ref 1–4.8)
LYMPHOCYTES NFR BLD: 7.8 % (ref 18–48)
MCH RBC QN AUTO: 29.8 PG (ref 27–31)
MCHC RBC AUTO-ENTMCNC: 33.1 G/DL (ref 32–36)
MCV RBC AUTO: 90 FL (ref 82–98)
MONOCYTES # BLD AUTO: 2.2 K/UL (ref 0.3–1)
MONOCYTES NFR BLD: 9.8 % (ref 4–15)
NEUTROPHILS # BLD AUTO: 18.3 K/UL (ref 1.8–7.7)
NEUTROPHILS NFR BLD: 80.9 % (ref 38–73)
NRBC BLD-RTO: 0 /100 WBC
PLATELET # BLD AUTO: 201 K/UL (ref 150–450)
PLATELET BLD QL SMEAR: ABNORMAL
PMV BLD AUTO: 10.6 FL (ref 9.2–12.9)
RBC # BLD AUTO: 3.82 M/UL (ref 4–5.4)
WBC # BLD AUTO: 22.57 K/UL (ref 3.9–12.7)

## 2024-05-20 PROCEDURE — 88307 TISSUE EXAM BY PATHOLOGIST: CPT | Performed by: PATHOLOGY

## 2024-05-20 PROCEDURE — 88307 TISSUE EXAM BY PATHOLOGIST: CPT | Mod: 26,,, | Performed by: PATHOLOGY

## 2024-05-20 PROCEDURE — 25000003 PHARM REV CODE 250

## 2024-05-20 PROCEDURE — 72100003 HC LABOR CARE, EA. ADDL. 8 HRS

## 2024-05-20 PROCEDURE — 72200005 HC VAGINAL DELIVERY LEVEL II

## 2024-05-20 PROCEDURE — 72100002 HC LABOR CARE, 1ST 8 HOURS

## 2024-05-20 PROCEDURE — 85025 COMPLETE CBC W/AUTO DIFF WBC: CPT | Performed by: OBSTETRICS & GYNECOLOGY

## 2024-05-20 PROCEDURE — 11000001 HC ACUTE MED/SURG PRIVATE ROOM

## 2024-05-20 PROCEDURE — 59409 OBSTETRICAL CARE: CPT | Mod: AT,,,

## 2024-05-20 PROCEDURE — 36415 COLL VENOUS BLD VENIPUNCTURE: CPT | Performed by: OBSTETRICS & GYNECOLOGY

## 2024-05-20 PROCEDURE — 25000003 PHARM REV CODE 250: Performed by: ADVANCED PRACTICE MIDWIFE

## 2024-05-20 RX ORDER — METHYLERGONOVINE MALEATE 0.2 MG/ML
200 INJECTION INTRAVENOUS ONCE AS NEEDED
Status: DISCONTINUED | OUTPATIENT
Start: 2024-05-20 | End: 2024-05-22 | Stop reason: HOSPADM

## 2024-05-20 RX ORDER — SIMETHICONE 80 MG
1 TABLET,CHEWABLE ORAL EVERY 6 HOURS PRN
Status: DISCONTINUED | OUTPATIENT
Start: 2024-05-20 | End: 2024-05-22 | Stop reason: HOSPADM

## 2024-05-20 RX ORDER — TRANEXAMIC ACID 10 MG/ML
1000 INJECTION, SOLUTION INTRAVENOUS EVERY 30 MIN PRN
Status: DISCONTINUED | OUTPATIENT
Start: 2024-05-20 | End: 2024-05-22 | Stop reason: HOSPADM

## 2024-05-20 RX ORDER — DIPHENHYDRAMINE HYDROCHLORIDE 50 MG/ML
25 INJECTION INTRAMUSCULAR; INTRAVENOUS EVERY 4 HOURS PRN
Status: DISCONTINUED | OUTPATIENT
Start: 2024-05-20 | End: 2024-05-22 | Stop reason: HOSPADM

## 2024-05-20 RX ORDER — MISOPROSTOL 200 UG/1
800 TABLET ORAL ONCE AS NEEDED
Status: DISCONTINUED | OUTPATIENT
Start: 2024-05-20 | End: 2024-05-22 | Stop reason: HOSPADM

## 2024-05-20 RX ORDER — OXYCODONE HYDROCHLORIDE 5 MG/1
5 TABLET ORAL ONCE
Status: COMPLETED | OUTPATIENT
Start: 2024-05-20 | End: 2024-05-21

## 2024-05-20 RX ORDER — DIPHENOXYLATE HYDROCHLORIDE AND ATROPINE SULFATE 2.5; .025 MG/1; MG/1
2 TABLET ORAL EVERY 6 HOURS PRN
Status: DISCONTINUED | OUTPATIENT
Start: 2024-05-20 | End: 2024-05-22 | Stop reason: HOSPADM

## 2024-05-20 RX ORDER — CARBOPROST TROMETHAMINE 250 UG/ML
250 INJECTION, SOLUTION INTRAMUSCULAR
Status: DISCONTINUED | OUTPATIENT
Start: 2024-05-20 | End: 2024-05-22 | Stop reason: HOSPADM

## 2024-05-20 RX ORDER — OXYTOCIN/RINGER'S LACTATE 30/500 ML
95 PLASTIC BAG, INJECTION (ML) INTRAVENOUS ONCE AS NEEDED
Status: DISCONTINUED | OUTPATIENT
Start: 2024-05-20 | End: 2024-05-22 | Stop reason: HOSPADM

## 2024-05-20 RX ORDER — SODIUM CHLORIDE 0.9 % (FLUSH) 0.9 %
10 SYRINGE (ML) INJECTION
Status: DISCONTINUED | OUTPATIENT
Start: 2024-05-20 | End: 2024-05-22 | Stop reason: HOSPADM

## 2024-05-20 RX ORDER — HYDROCORTISONE 25 MG/G
CREAM TOPICAL 3 TIMES DAILY PRN
Status: DISCONTINUED | OUTPATIENT
Start: 2024-05-20 | End: 2024-05-22 | Stop reason: HOSPADM

## 2024-05-20 RX ORDER — ACETAMINOPHEN 325 MG/1
650 TABLET ORAL EVERY 6 HOURS SCHEDULED
Status: DISCONTINUED | OUTPATIENT
Start: 2024-05-20 | End: 2024-05-22 | Stop reason: HOSPADM

## 2024-05-20 RX ORDER — DIPHENHYDRAMINE HCL 25 MG
25 CAPSULE ORAL EVERY 4 HOURS PRN
Status: DISCONTINUED | OUTPATIENT
Start: 2024-05-20 | End: 2024-05-22 | Stop reason: HOSPADM

## 2024-05-20 RX ORDER — OXYCODONE HYDROCHLORIDE 5 MG/1
5 TABLET ORAL ONCE
Status: COMPLETED | OUTPATIENT
Start: 2024-05-20 | End: 2024-05-20

## 2024-05-20 RX ORDER — POLYETHYLENE GLYCOL 3350 17 G/17G
17 POWDER, FOR SOLUTION ORAL DAILY PRN
Status: DISCONTINUED | OUTPATIENT
Start: 2024-05-20 | End: 2024-05-22 | Stop reason: HOSPADM

## 2024-05-20 RX ORDER — OXYTOCIN/RINGER'S LACTATE 30/500 ML
334 PLASTIC BAG, INJECTION (ML) INTRAVENOUS ONCE AS NEEDED
Status: DISCONTINUED | OUTPATIENT
Start: 2024-05-20 | End: 2024-05-22 | Stop reason: HOSPADM

## 2024-05-20 RX ORDER — ONDANSETRON 8 MG/1
8 TABLET, ORALLY DISINTEGRATING ORAL EVERY 8 HOURS PRN
Status: DISCONTINUED | OUTPATIENT
Start: 2024-05-20 | End: 2024-05-22 | Stop reason: HOSPADM

## 2024-05-20 RX ORDER — IBUPROFEN 600 MG/1
600 TABLET ORAL EVERY 6 HOURS
Status: DISCONTINUED | OUTPATIENT
Start: 2024-05-20 | End: 2024-05-22 | Stop reason: HOSPADM

## 2024-05-20 RX ORDER — OXYTOCIN/RINGER'S LACTATE 30/500 ML
95 PLASTIC BAG, INJECTION (ML) INTRAVENOUS ONCE
Status: DISCONTINUED | OUTPATIENT
Start: 2024-05-20 | End: 2024-05-22 | Stop reason: HOSPADM

## 2024-05-20 RX ORDER — OXYTOCIN 10 [USP'U]/ML
10 INJECTION, SOLUTION INTRAMUSCULAR; INTRAVENOUS ONCE AS NEEDED
Status: DISCONTINUED | OUTPATIENT
Start: 2024-05-20 | End: 2024-05-22 | Stop reason: HOSPADM

## 2024-05-20 RX ADMIN — ACETAMINOPHEN 650 MG: 325 TABLET, FILM COATED ORAL at 11:05

## 2024-05-20 RX ADMIN — OXYCODONE 5 MG: 5 TABLET ORAL at 02:05

## 2024-05-20 RX ADMIN — IBUPROFEN 600 MG: 600 TABLET, FILM COATED ORAL at 12:05

## 2024-05-20 RX ADMIN — Medication 334 MILLI-UNITS/MIN: at 02:05

## 2024-05-20 RX ADMIN — ACETAMINOPHEN 650 MG: 325 TABLET, FILM COATED ORAL at 12:05

## 2024-05-20 RX ADMIN — ACETAMINOPHEN 650 MG: 325 TABLET, FILM COATED ORAL at 06:05

## 2024-05-20 RX ADMIN — SIMETHICONE 80 MG: 80 TABLET, CHEWABLE ORAL at 11:05

## 2024-05-20 RX ADMIN — IBUPROFEN 600 MG: 600 TABLET, FILM COATED ORAL at 06:05

## 2024-05-20 RX ADMIN — IBUPROFEN 600 MG: 600 TABLET, FILM COATED ORAL at 11:05

## 2024-05-20 NOTE — PROGRESS NOTES
"LABOR NOTE    S:  Pt out of birthing tub (she got in at 1845), coping well, endorsing increased intensity with contractions.  Family at bedside and supportive.     O: /65   Pulse 87   Temp 97.8 °F (36.6 °C) (Oral)   Resp 17   Ht 5' 6" (1.676 m)   Wt 95.7 kg (210 lb 15.7 oz)   LMP 2023   SpO2 99%   Breastfeeding No   BMI 34.05 kg/m²     GENERAL: Calm and appropriate affect  NEURO: Alert, oriented, normal speech  ABDOMEN: Nontender, Fundus palpates soft between UC's.  FHT: Baseline 130, moderate variability, positive accels, no decels. Cat 1, reassuring.  CTX: q 1-2 minutes, tachysystole, pitocin decreased to 4  SVE: /-1      ASSESSMENT:   23 y.o.  IUP at 38w0d, FHT reassuring/ Cat 1    Patient Active Problem List   Diagnosis    Fetal arrhythmia affecting pregnancy, antepartum    Pregnancy    Fetal growth restriction antepartum    Encounter for planned induction of labor         PLAN:  Continue close Maternal/Fetal monitoring  Pitocin Augmentation per protocol - currently infusing at 4mu  Recheck 2 hours or PRN  Patient desires nitrous oxide, anesthesia notified  Start keyana circuit  Epidural per anesthesia at pt's request        "

## 2024-05-20 NOTE — L&D DELIVERY NOTE
[facility] - Labor & Delivery  Vaginal Delivery   Operative Note    SUMMARY     Normal spontaneous vaginal delivery of live infant after 40 minutes of excellent maternal expulsive effort, was placed on mothers abdomen for skin to skin and bulb suctioning performed.  Infant delivered position JESS over intact perineum.  Nuchal cord: No.    Spontaneous delivery of placenta and IV pitocin given noting good uterine tone.  No lacerations noted.  Patient tolerated delivery well. Sponge needle and lap counted correctly x2.    EBL:100    Indications:  (spontaneous vaginal delivery)  Pregnancy complicated by:   Patient Active Problem List   Diagnosis    Fetal arrhythmia affecting pregnancy, antepartum    Pregnancy    Fetal growth restriction antepartum     (spontaneous vaginal delivery)     Admitting GA: 38w1d    Delivery Information for Mauricio Jackson    Birth information:  YOB: 2024   Time of birth: 2:32 AM   Sex: male   Head Delivery Date/Time: 2024  2:32 AM   Delivery type:    Gestational Age: 38w1d        Delivery Providers    Delivering clinician: Laura Landa CNM   Provider Role    Karishma Collins RN Hilkirk, Jennifer, RN Vicknair, Kristine, MUKESH Whitt, Juan, ST               Measurements    Weight:   Length:          Apgars    Living status: Living  Apgar Component Scores:  1 min.:  5 min.:  10 min.:  15 min.:  20 min.:    Skin color:  0  1       Heart rate:  2  2       Reflex irritability:  2  2       Muscle tone:  2  2       Respiratory effort:  2  2       Total:  8  9       Apgars assigned by: JIMMY HOLLIDAY RN         Operative Delivery    Forceps attempted?: No  Vacuum extractor attempted?: No         Shoulder Dystocia    Shoulder dystocia present?: No           Presentation    Presentation: Vertex  Position: Right Occiput Anterior           Interventions/Resuscitation    Method: Tactile Stimulation       Cord    Vessels: 3 vessels  Complications: None  Delayed Cord  Clamping?: Yes  Cord Clamped Date/Time: 2024  2:34 AM  Cord Blood Disposition: Sent with Baby  Gases Sent?: No  Stem Cell Collection (by MD): No       Placenta    Placenta delivery date/time: 2024 0238  Placenta removal: Spontaneous  Placenta appearance: Intact  Placenta disposition: Pathology           Labor Events:       labor: No     Labor Onset Date/Time:         Dilation Complete Date/Time: 2024 01:45     Start Pushing Date/Time: 2024 01:52       Start Pushing Date/Time: 2024 01:52     Rupture Date/Time: 24         Rupture type: ARM (Artificial Rupture)         Fluid Amount:       Fluid Color: Clear               steroids: None     Antibiotics given for GBS: No     Induction: balloon dilation (Barros);oxytocin     Indications for induction:  Intrauterine Growth Restriction     Augmentation: amniotomy     Indications for augmentation: Ineffective Contraction Pattern     Labor complications: None     Additional complications:          Cervical ripening:                     Delivery:      Episiotomy: None     Indication for Episiotomy:       Perineal Lacerations: None Repaired:      Periurethral Laceration:   Repaired:     Labial Laceration:   Repaired:     Sulcus Laceration:   Repaired:     Vaginal Laceration:   Repaired:     Cervical Laceration:   Repaired:     Repair suture: None     Repair # of packets:       Last Value - EBL - Nursing (mL):       Sum - EBL - Nursing (mL): 0     Last Value - EBL - Anesthesia (mL):      Calculated QBL (mL):       Running total QBL (mL):       Vaginal Sweep Performed: Yes     Surgicount Correct: Yes     Vaginal Packing: No Quantity:       Other providers:       Anesthesia    Method: Epidural          Details (if applicable):  Trial of Labor      Categorization:      Priority:     Indications for :     Incision Type:       Additional  information:  Forceps:    Vacuum:    Breech:    Observed  anomalies    Other (Comments):

## 2024-05-20 NOTE — LACTATION NOTE
Lactation visited. Assisted with latching the baby to left breast in cross cradle with minimal assist needed. Baby latched with good sucks, breast compression kept the baby actively feeding. Occasional swallows noted. Pt encouraged to feed the baby 8 or times in 24hrs on cue until content.    05/20/24 1020   Maternal Assessment   Breast Shape Bilateral:;round   Breast Density Bilateral:;soft   Areola Bilateral:;elastic   Nipples Bilateral:;everted   Maternal Infant Feeding   Maternal Emotional State relaxed;assist needed   Infant Positioning cross-cradle   Signs of Milk Transfer audible swallow;infant jaw motion present   Pain with Feeding no   Latch Assistance yes

## 2024-05-20 NOTE — ANESTHESIA PROCEDURE NOTES
CSE    Patient location during procedure: OB   Reason for block: primary anesthetic   Reason for block: labor analgesia requested by patient and obstetrician  Diagnosis: IUP   Start time: 5/19/2024 10:17 PM  Timeout: 5/19/2024 10:16 PM  End time: 5/19/2024 10:27 PM  Surgery related to: Vaginal Delivery    Staffing  Performing Provider: Rashid Braden DO  Authorizing Provider: Lulu Goldstein MD    Staffing  Performed by: Rashid Braden DO  Authorized by: Lulu Goldstein MD        Preanesthetic Checklist  Completed: patient identified, IV checked, site marked, risks and benefits discussed, surgical consent, monitors and equipment checked, pre-op evaluation, timeout performed, anesthesia consent given, hand hygiene performed and patient being monitored  Preparation  Patient position: sitting  Prep: ChloraPrep  Patient monitoring: Pulse Ox  Reason for block: primary anesthetic   Epidural  Skin Anesthetic: lidocaine 1%  Skin Wheal: 3 mL  Administration type: continuous  Approach: midline  Interspace: L3-4    Injection technique: DAWN saline  Needle and Epidural Catheter  Needle type: Tuohy   Needle gauge: 17  Needle length: 3.5 inches  Needle insertion depth: 8 cm  Catheter type: Marqeta  Catheter size: 19 G  Catheter at skin depth: 12 cm    Test dose: 3 mL of lidocaine 1.5% with Epi 1-to-200,000  Additional Documentation: incremental injection, negative aspiration for heme and CSF, no paresthesia on injection, no signs/symptoms of IV or SA injection, no significant pain on injection and no significant complaints from patient  Needle localization: anatomical landmarks  Medications:  Volume per aspiration: 5 mL  Time between aspirations: 5 minutes   Assessment  Ease of block: easy  Patient's tolerance of the procedure: comfortable throughout block and no complaints  Additional Notes  1cc given through spinal needle from bag for CSE, tolerated well  No inadvertent dural puncture with Tuohy.  Dural puncture performed  with spinal needle.

## 2024-05-20 NOTE — PROGRESS NOTES
05/20/24 0222   [facility] Note - Strip   Strip Reviewed by [facility] Nurse? Yes   [facility] Note - Communication   [place] Nurse Communicated with Bedside Nurse Regarding: Fetal Status     Nurse is at the bedside.

## 2024-05-20 NOTE — PROGRESS NOTES
"LABOR NOTE    S:  Pt finished using nitrous 45 mins ago. Experiencing difficulty coping; significant pain with contractions.  Family at bedside and supportive.     O: /65   Pulse 87   Temp 97.8 °F (36.6 °C) (Oral)   Resp 17   Ht 5' 6" (1.676 m)   Wt 95.7 kg (210 lb 15.7 oz)   LMP 2023   SpO2 99%   Breastfeeding No   BMI 34.05 kg/m²     GENERAL: Calm and appropriate affect  NEURO: Alert, oriented, normal speech  ABDOMEN: Nontender, Fundus palpates soft between UC's.  FHT: Baseline 130, moderate variability, positive accels, intermittent early decels. Cat 1, reassuring.  CTX: q 1-3 minutes  SVE: 6/80/-1, fetal head better applied to cervix, mild caput noted      ASSESSMENT:   23 y.o.  IUP at 38w0d, FHT reassuring/ Cat 1    Patient Active Problem List   Diagnosis    Fetal arrhythmia affecting pregnancy, antepartum    Pregnancy    Fetal growth restriction antepartum    Encounter for planned induction of labor         PLAN:  Continue close Maternal/Fetal monitoring  Pitocin Augmentation per protocol - currently infusing at 2mu, increased to 6 after exam  Recheck 2 hours or PRN  Epidural per anesthesia at pt's request  Patient desires another round of hydrotherapy         "

## 2024-05-20 NOTE — PLAN OF CARE
VSS. Patient ambulating and voiding independently and without difficulty. Fundus firm without massage, midline, with moderate rubra noted. Pain controlled with pain medications. Safety maintained, bed low and in a locked position. Significant other at bedside. Breastfeeding every 2-3 hours with moderate assistance and in response to infant cues. No further concerns at this time, will continue to monitor.

## 2024-05-20 NOTE — PROGRESS NOTES
RN called stating patient is 10/100/+3. To bedside to begin pushing and continuous presence provided.

## 2024-05-20 NOTE — PROGRESS NOTES
"LABOR NOTE    S:  Pt resting comfortably with epidural, no complaints.  Family at bedside and supportive.     O: BP (!) 105/57   Pulse 89   Temp 97.8 °F (36.6 °C) (Oral)   Resp 17   Ht 5' 6" (1.676 m)   Wt 95.7 kg (210 lb 15.7 oz)   LMP 2023   SpO2 100%   Breastfeeding No   BMI 34.05 kg/m²     GENERAL: Calm and appropriate affect  NEURO: Alert, oriented, normal speech  ABDOMEN: Nontender, Fundus palpates soft between UC's.  FHT: Baseline 135, moderate variability, positive accels, no decels. Cat 1, reassuring.  CTX: q 2 minutes  SVE: 7/90/0, IUPC placed      ASSESSMENT:   23 y.o.  IUP at 38w1d, FHT reassuring/ Cat 1    Patient Active Problem List   Diagnosis    Fetal arrhythmia affecting pregnancy, antepartum    Pregnancy    Fetal growth restriction antepartum    Encounter for planned induction of labor         PLAN:  Continue close Maternal/Fetal monitoring  Pitocin Augmentation per protocol - currently infusing at 4mu  Recheck 2 hours or PRN  Epidural infusing        "

## 2024-05-20 NOTE — PLAN OF CARE
24 1017   OB SCREEN   Assessment Type Discharge Planning Brief Assessment   Source of Information health record   Received Prenatal Care Yes   Any indications/suspicions for None   Is this a teen pregnancy No   Is the baby in NICU No   Indication for adoption/Safe Haven No   Indication for DME/post-acute needs No   HIV (+) No   Fetal demise/ death No     Patient has been screened for Social Work discharge planning needs. Based on documentation in medical record, no discharge planning needs are anticipated at this time. Should any discharge planning needs arise, please consult .

## 2024-05-20 NOTE — PROGRESS NOTES
Patient s/p epidural, unable to inflate rich balloon; SVE 6/90/0, anticipate continued cervical change, IUPC at next exam in 1-2 hr if unchanged

## 2024-05-21 PROBLEM — O36.5990 FETAL GROWTH RESTRICTION ANTEPARTUM: Status: RESOLVED | Noted: 2024-05-13 | Resolved: 2024-05-21

## 2024-05-21 PROCEDURE — 99232 SBSQ HOSP IP/OBS MODERATE 35: CPT | Mod: ,,, | Performed by: ADVANCED PRACTICE MIDWIFE

## 2024-05-21 PROCEDURE — 11000001 HC ACUTE MED/SURG PRIVATE ROOM

## 2024-05-21 PROCEDURE — 25000003 PHARM REV CODE 250: Performed by: ADVANCED PRACTICE MIDWIFE

## 2024-05-21 PROCEDURE — 25000003 PHARM REV CODE 250

## 2024-05-21 RX ORDER — OXYCODONE AND ACETAMINOPHEN 10; 325 MG/1; MG/1
1 TABLET ORAL ONCE
Status: COMPLETED | OUTPATIENT
Start: 2024-05-21 | End: 2024-05-21

## 2024-05-21 RX ORDER — OXYCODONE HYDROCHLORIDE 10 MG/1
10 TABLET ORAL EVERY 6 HOURS PRN
Status: DISCONTINUED | OUTPATIENT
Start: 2024-05-21 | End: 2024-05-21

## 2024-05-21 RX ADMIN — ACETAMINOPHEN 650 MG: 325 TABLET, FILM COATED ORAL at 11:05

## 2024-05-21 RX ADMIN — OXYCODONE AND ACETAMINOPHEN 1 TABLET: 10; 325 TABLET ORAL at 09:05

## 2024-05-21 RX ADMIN — IBUPROFEN 600 MG: 600 TABLET, FILM COATED ORAL at 04:05

## 2024-05-21 RX ADMIN — IBUPROFEN 600 MG: 600 TABLET, FILM COATED ORAL at 10:05

## 2024-05-21 RX ADMIN — ACETAMINOPHEN 650 MG: 325 TABLET, FILM COATED ORAL at 04:05

## 2024-05-21 RX ADMIN — IBUPROFEN 600 MG: 600 TABLET, FILM COATED ORAL at 11:05

## 2024-05-21 RX ADMIN — OXYCODONE 5 MG: 5 TABLET ORAL at 12:05

## 2024-05-21 RX ADMIN — POLYETHYLENE GLYCOL 3350 17 G: 17 POWDER, FOR SOLUTION ORAL at 09:05

## 2024-05-21 RX ADMIN — OXYCODONE HYDROCHLORIDE 10 MG: 10 TABLET ORAL at 10:05

## 2024-05-21 NOTE — SUBJECTIVE & OBJECTIVE
"Interval History:     She is doing ok this morning. She is tolerating a regular diet without nausea or vomiting. She is voiding spontaneously. She is ambulating. She has passed flatus, and has not a BM. Vaginal bleeding is mild. She denies fever or chills. Abdominal pain is moderate and controlled with oral medications. She Is breastfeeding and formula feeding; endorses significant nipple pain with breastfeeding and desire to "take a break" from it today. She desires circumcision for her male baby: yes.    Objective:     Vital Signs (Most Recent):  Temp: 98.2 °F (36.8 °C) (05/21/24 0046)  Pulse: 81 (05/21/24 0046)  Resp: 18 (05/21/24 0059)  BP: (!) 100/53 (05/21/24 0046)  SpO2: 97 % (05/21/24 0046) Vital Signs (24h Range):  Temp:  [98.2 °F (36.8 °C)-98.3 °F (36.8 °C)] 98.2 °F (36.8 °C)  Pulse:  [64-81] 81  Resp:  [17-18] 18  SpO2:  [97 %-100 %] 97 %  BP: (100-108)/(53-64) 100/53     Weight: 95.7 kg (210 lb 15.7 oz)  Body mass index is 34.05 kg/m².      Intake/Output Summary (Last 24 hours) at 5/21/2024 0836  Last data filed at 5/20/2024 1200  Gross per 24 hour   Intake --   Output 800 ml   Net -800 ml         Significant Labs:  Lab Results   Component Value Date    GROUPTRH B POS 05/19/2024    STREPBCULT No Group B Streptococcus isolated 05/08/2024     Recent Labs   Lab 05/20/24  0934   HGB 11.4*   HCT 34.4*       I have personallly reviewed all pertinent lab results from the last 24 hours.    Physical Exam:   Constitutional: She is oriented to person, place, and time. She appears well-developed and well-nourished.    HENT:   Head: Normocephalic and atraumatic.       Pulmonary/Chest: Effort normal.        Abdominal: Soft. Distension: appropriate for PPD #1.   Fundus firm @ -1 below umbilicus      Genitourinary:    Vagina and uterus normal.             Musculoskeletal: Normal range of motion and moves all extremeties. No edema.       Neurological: She is alert and oriented to person, place, and time.    Skin: Skin is " warm and dry.    Psychiatric:   tearful       Review of Systems   Constitutional: Negative.    HENT: Negative.     Eyes: Negative.    Respiratory: Negative.     Cardiovascular: Negative.    Gastrointestinal:  Abdominal pain: cramping.   Endocrine: Negative.    Genitourinary:  Vaginal bleeding: light lochia rubra.   Musculoskeletal: Negative.    Integumentary:  Nipple discharge: lactating. Negative.   Neurological: Negative.    Hematological: Negative.    Psychiatric/Behavioral:  The patient is nervous/anxious.    Breast: Nipple discharge: lactating.

## 2024-05-21 NOTE — PROGRESS NOTES
Sw consulted to see pt: anxiety. Sw visited with pt in room 624 to complete assessment and address consult. Pt was alert, oriented, and easily engaged. Sw introduced self and explained the purpose of visit.     Pt confirmed with pt that she has anxiety and the circumstances around her pregnancy. Pt shared that she participates in a support group [facility] at [facility] which is for single mothers. Pt shared that she participated in support groups in [place] and also received therapy. Sw offered to refer pt to Dr. Valdes and Dr. Santos. Pt in agreement and sw to do same.    Discharge Planning:  [place] Primary: Yes Secondary: No   [facility]     Pediatrician for : [facility]      Nutrition Plan for [place]: Breast Milk      WIC: Pt already certified; will also apply for     Essential Items for : (includes car seat, crib/bassinet/pack-n-play, clothing, bottles, diapers, etc.)  Acquired     Transportation: Family/friends     Potential Discharge Needs:  None

## 2024-05-21 NOTE — DISCHARGE INSTRUCTIONS
Community Resources for Breastfeeding Mothers:   Hospital Breastfeeding Centers/ Lactation Consultants:   [facility]........................................................................................[phone]   [facility]....................................................................................[phone]   [facility]..........[phone]   [facility] [place].................[phone]   [facility].......................................................[phone]   [facility].................................[phone]   [facility].......................................[phone]   [facility]..................................................[phone]   [facility].....................................................................[phone]      [facility]AP[facility] (Poison Control)...........................................................[phone]    PoisonHelp.org   Free medical advice 24/7 through the Poison Help Line and the online tool      Online Resources:   International Breastfeeding [place] ...............................................................................ibconline.ca   Dr Jhon Saavedra online resource provides videos, articles, and information sheets.     Coeffective...............................................................................................................coeffective.com   Download the free mobile rylee to help get off to a great start with breastfeeding.   Droplet.....................................................................................................................Twice.com   Global Health  Media...........................................................................................ZUGGI.org   Videos that teach and empower mothers and caregivers   Infant [facility].............................................................................[phone]      Metrilo   Provides up to date information for medication use by moms during pregnancy and while breastfeeding.   Celine Penaloza....................................................................................................................kellymom.com   Provides online information on breastfeeding and parenting      La Leche League........................................................................................ lllalmsla.org   /   llli.org   Mother-to-mother support groups with education, information support, and encouragement    Work and Pump........................................................................................... Singular.com   Information about breastfeeding for working moms     [place] Resources:   [place] Breastfeeding [facility]............................... [phone]    [facility]   Find local breastfeeding support   [place] Breastfeeding Support............................................................ LaBreastfeedingSport.org   Zip code search of breastfeeding resources in your area   [facility]ners for Healthy Babies............................................................[phone]   8620811uxdx.org   Connects [place] moms and their families to health and pregnancy resources.  24/7   WIC (Women, Infant, Children)......................................................... [phone]   ldh.la.gov/WIC   Download the [employer] rylee, get code from WIC office    [facility] Resources:     Baby Cafe............................................................................................................. babycafeusa.org   Free, drop in, informal  breastfeeding support groups offering professional lactation care and intervention.    [facility]/ [place] [facility]....................................... birthmarkShare Practice.com   Infant feeding drop in clinics, Lactation services, support groups, education programs   [facility]...............................................[phone]   Krush.com/groups/[facility]   Free breastfeeding support group for families of color   Mothers Milk [facility]....................................................[phone]                                                             [employer]/services/mothers-milk-bank[facility]   DAYANA Nesting..................................................................................[phone] nolanesting.com   In person and virtual support for families through pregnancy, birth, and early parenthood.       [facility] [place]- Dr. Felicitas Martinez.......................[phone]   [address] [place] [address]                                  [email or web address]   [email or web address]   HackerHAND Lactation Care, [facility] (Daily Reddy RN, IBCLC) ............................[phone] [email or web address]    Healthy Start [place].....................................[phone] ([place])  [phone] ([place])   [facility].gov/health-department/healthy-start   Serves women of childbearing age and addresses issues for pregnant women and their children from birth  to age two.          La Leche League- [place]............................. TwoFish.CrowdStreet/ Krush.CrowdStreet/ ExaDigmdemi   In person and virtual mother to mother support groups with education, information support and   encouragement to women who want to breastfeed      [place] Resources:   Breastfeeding Resources- [facility]  Health.....msdh.ms.gov (under womens services)   Find resources and info about planning for breastfeeding, its benefits, and help with breastfeeding  s uccessfully.    [facility]regnancy Choices- [place]....................................... advisorCONNECT   [phone]   [address] [place], [place]. Call or text 24/7   [facility].......................................................Mobile Security Softwareastbreastfeedingcenter.U.S. Local News Network   [facility] Lactation Consultants sere [facility], including [facility],   [facility], and surrounding areas.    [place] Breastfeeding Coalition...............................................................................msbfc.org   Promotes and supports breastfeeding with families, health providers, and communities.   [facility] breastfeeding Coalition.....................................................................smbfc.org   Find breastfeeding resources and support groups in your area.    WIC Nutrition Program- [facility] of Health.................................... ms.gov

## 2024-05-21 NOTE — ANESTHESIA POSTPROCEDURE EVALUATION
Anesthesia Post Evaluation    Patient: Cole Jackson    Procedure(s) Performed: * No procedures listed *    Final Anesthesia Type: epidural      Patient location during evaluation: floor  Patient participation: Yes- Able to Participate  Level of consciousness: awake and alert and oriented  Post-procedure vital signs: reviewed and stable  Pain management: adequate  Airway patency: patent  SHELLY mitigation strategies: Multimodal analgesia  PONV status at discharge: No PONV  Anesthetic complications: no      Cardiovascular status: hemodynamically stable and blood pressure returned to baseline  Respiratory status: unassisted, spontaneous ventilation and room air  Hydration status: euvolemic  Follow-up not needed.              Vitals Value Taken Time   /53 05/21/24 0046   Temp 36.8 °C (98.2 °F) 05/21/24 0046   Pulse 81 05/21/24 0046   Resp 18 05/21/24 0059   SpO2 97 % 05/21/24 0046         No case tracking events are documented in the log.      Pain/Dillan Score: Pain Rating Prior to Med Admin: 6 (5/21/2024  4:50 AM)  Pain Rating Post Med Admin: 5 (5/21/2024  5:46 AM)

## 2024-05-21 NOTE — PROGRESS NOTES
"[facility] ([place])  Obstetrics  Postpartum Progress Note    Patient Name: Cole Jackson  MRN: [id number]  Admission Date: 2024  Hospital Length of Stay: 2 days  Attending Physician: Laura Sunshine MD  Primary Care Provider: Evert Pearson MD    Subjective:     Principal Problem: (spontaneous vaginal delivery)    Hospital Course:  SVE 1.5/60/-2 @ 1300, rich placed without difficulty  High dose pitocin discussed with physician team and ordered  Standard IOL labs (CBC, T&S, TPA)  Desires unmedicated birth, open to nitrous, discussed no IV pain medications in labor and in light of FGR  Hydrotherapy PRN in active labor  Recheck in 4 hours or sooner PRN, AROM when feasible    24 PPD #1, doing ok. Struggling with breastfeeding.     Interval History:     She is doing ok this morning. She is tolerating a regular diet without nausea or vomiting. She is voiding spontaneously. She is ambulating. She has passed flatus, and has not a BM. Vaginal bleeding is mild. She denies fever or chills. Abdominal pain is moderate and controlled with oral medications. She Is breastfeeding and formula feeding; endorses significant nipple pain with breastfeeding and desire to "take a break" from it today. She desires circumcision for her male baby: yes.    Objective:     Vital Signs (Most Recent):  Temp: 98.2 °F (36.8 °C) (24 0046)  Pulse: 81 (24 0046)  Resp: 18 (24 0059)  BP: (!) 100/53 (24 0046)  SpO2: 97 % (246) Vital Signs (24h Range):  Temp:  [98.2 °F (36.8 °C)-98.3 °F (36.8 °C)] 98.2 °F (36.8 °C)  Pulse:  [64-81] 81  Resp:  [17-18] 18  SpO2:  [97 %-100 %] 97 %  BP: (100-108)/(53-64) 100/53     Weight: 95.7 kg (210 lb 15.7 oz)  Body mass index is 34.05 kg/m².      Intake/Output Summary (Last 24 hours) at 2024 0836  Last data filed at 2024 1200  Gross per 24 hour   Intake --   Output 800 ml   Net -800 ml         Significant Labs:  Lab Results   Component Value Date "    GROUPTRH B POS 2024    STREPBCULT No Group B Streptococcus isolated 2024     Recent Labs   Lab 24  0934   HGB 11.4*   HCT 34.4*       I have personallly reviewed all pertinent lab results from the last 24 hours.    Physical Exam:   Constitutional: She is oriented to person, place, and time. She appears well-developed and well-nourished.    HENT:   Head: Normocephalic and atraumatic.       Pulmonary/Chest: Effort normal.        Abdominal: Soft. Distension: appropriate for PPD #1.   Fundus firm @ -1 below umbilicus      Genitourinary:    Vagina and uterus normal.             Musculoskeletal: Normal range of motion and moves all extremeties. No edema.       Neurological: She is alert and oriented to person, place, and time.    Skin: Skin is warm and dry.    Psychiatric:   tearful       Review of Systems   Constitutional: Negative.    HENT: Negative.     Eyes: Negative.    Respiratory: Negative.     Cardiovascular: Negative.    Gastrointestinal:  Abdominal pain: cramping.   Endocrine: Negative.    Genitourinary:  Vaginal bleeding: light lochia rubra.   Musculoskeletal: Negative.    Integumentary:  Nipple discharge: lactating. Negative.   Neurological: Negative.    Hematological: Negative.    Psychiatric/Behavioral:  The patient is nervous/anxious.    Breast: Nipple discharge: lactating.    Assessment/Plan:     23 y.o. female  for:    *  (spontaneous vaginal delivery)  Routine PP care and advances    Difficulty of mother performing breastfeeding  Consult lactation        Disposition: As patient meets milestones, will plan to discharge home tomorrow.    Adrienne Sharma CNM  Obstetrics  [facility] ([place])

## 2024-05-21 NOTE — LACTATION NOTE
05/21/24 1324   Maternal Infant Feeding   Maternal Emotional State relaxed   Latch Assistance   (to call)     Lactation note: lactation rounds. Pt education reviewed for basic education. Pt states that she started giving formula this morning secondary to concern of effectiveness of infant feedings. LC reviewed education and asked pt her plan for home. Pt would like to breastfeed and do some bottle feeding. Discussed nursing, pumping and offering supplements post breastfeeding. Reviewed supply and demand. Encouraged pt to call LC for latch assistance with next feeding.

## 2024-05-22 ENCOUNTER — PATIENT MESSAGE (OUTPATIENT)
Dept: PSYCHIATRY | Facility: CLINIC | Age: 24
End: 2024-05-22
Payer: MEDICAID

## 2024-05-22 VITALS
BODY MASS INDEX: 33.91 KG/M2 | OXYGEN SATURATION: 97 % | HEART RATE: 63 BPM | RESPIRATION RATE: 18 BRPM | TEMPERATURE: 99 F | WEIGHT: 211 LBS | SYSTOLIC BLOOD PRESSURE: 102 MMHG | DIASTOLIC BLOOD PRESSURE: 61 MMHG | HEIGHT: 66 IN

## 2024-05-22 PROCEDURE — 25000003 PHARM REV CODE 250: Performed by: ADVANCED PRACTICE MIDWIFE

## 2024-05-22 PROCEDURE — 99238 HOSP IP/OBS DSCHRG MGMT 30/<: CPT | Mod: ,,, | Performed by: OBSTETRICS & GYNECOLOGY

## 2024-05-22 PROCEDURE — 99238 HOSP IP/OBS DSCHRG MGMT 30/<: CPT | Mod: ,,,

## 2024-05-22 PROCEDURE — 25000003 PHARM REV CODE 250

## 2024-05-22 RX ORDER — ACETAMINOPHEN 325 MG/1
650 TABLET ORAL EVERY 4 HOURS PRN
Qty: 30 TABLET | Refills: 0 | Status: SHIPPED | OUTPATIENT
Start: 2024-05-22

## 2024-05-22 RX ORDER — ACETAMINOPHEN 325 MG/1
650 TABLET ORAL EVERY 4 HOURS PRN
Qty: 30 TABLET | Refills: 0 | Status: SHIPPED | OUTPATIENT
Start: 2024-05-22 | End: 2024-05-22

## 2024-05-22 RX ADMIN — IBUPROFEN 600 MG: 600 TABLET, FILM COATED ORAL at 05:05

## 2024-05-22 RX ADMIN — IBUPROFEN 600 MG: 600 TABLET, FILM COATED ORAL at 11:05

## 2024-05-22 RX ADMIN — ACETAMINOPHEN 650 MG: 325 TABLET, FILM COATED ORAL at 05:05

## 2024-05-22 RX ADMIN — ACETAMINOPHEN 650 MG: 325 TABLET, FILM COATED ORAL at 11:05

## 2024-05-22 NOTE — PLAN OF CARE
05/22/24 1028   Final Note   Assessment Type Final Discharge Note   Anticipated Discharge Disposition Home   Hospital Resources/Appts/Education Provided Community resources provided   Post-Acute Status   Discharge Delays None known at this time     Sw visited with pt this morning and provided pt with therapy resource list. Pt would like referral to [facility] for behavioral health services. Message sent via secure chat. At this time, there are no other social work discharge needs.

## 2024-05-22 NOTE — SUBJECTIVE & OBJECTIVE
"Interval History: PPD2    She is doing well this morning. She is tolerating a regular diet without nausea or vomiting. She is voiding spontaneously. She is ambulating. She has passed flatus, and has not a BM. Vaginal bleeding is mild. She denies fever or chills. Abdominal pain is mild and controlled with oral medications. She Is breastfeeding. She desires circumcision for her male baby: yes - already done.    Objective:     Vital Signs (Most Recent):  Temp: 98.8 °F (37.1 °C) (05/22/24 0831)  Pulse: 63 (05/22/24 0831)  Resp: 18 (05/22/24 0831)  BP: 102/61 (05/22/24 0831)  SpO2: 97 % (05/22/24 0831) Vital Signs (24h Range):  Temp:  [97.4 °F (36.3 °C)-98.8 °F (37.1 °C)] 98.8 °F (37.1 °C)  Pulse:  [63-72] 63  Resp:  [17-18] 18  SpO2:  [97 %-99 %] 97 %  BP: ()/(55-61) 102/61     Weight: 95.7 kg (210 lb 15.7 oz)  Body mass index is 34.05 kg/m².    No intake or output data in the 24 hours ending 05/22/24 0957      Significant Labs:  Lab Results   Component Value Date    GROUPTRH B POS 05/19/2024    STREPBCULT No Group B Streptococcus isolated 05/08/2024     No results for input(s): "HGB", "HCT" in the last 48 hours.    I have personallly reviewed all pertinent lab results from the last 24 hours.    Physical Exam:   Constitutional: She is oriented to person, place, and time. She appears well-developed and well-nourished.    HENT:   Head: Normocephalic and atraumatic.    Eyes: Conjunctivae are normal.     Cardiovascular:  Normal rate.             Pulmonary/Chest: Effort normal.        Abdominal: Soft. There is no abdominal tenderness.     Genitourinary: There is vaginal discharge (lochia) in the vagina.           Musculoskeletal: Normal range of motion.       Neurological: She is alert and oriented to person, place, and time.    Skin: Skin is warm and dry.    Psychiatric: She has a normal mood and affect. Her behavior is normal. Judgment and thought content normal.       Review of Systems   Constitutional:  Negative " for chills and fever.   Eyes: Negative.    Respiratory: Negative.     Cardiovascular: Negative.    Gastrointestinal:  Positive for abdominal pain (cramping). Negative for nausea and vomiting.   Endocrine: Negative.    Genitourinary:  Positive for vaginal bleeding (lochia). Negative for vaginal odor.   Musculoskeletal: Negative.    Integumentary:  Negative.   Neurological: Negative.    Hematological: Negative.    Psychiatric/Behavioral: Negative.     Breast: negative.

## 2024-05-22 NOTE — ASSESSMENT & PLAN NOTE
5/22/24 PPD2 - doing well today, normal lochia, pain controlled with PO meds. Breastfeeding still painful, working with lactation. Discharge home today.

## 2024-05-22 NOTE — LACTATION NOTE
05/22/24 0950   Maternal Assessment   Breast Shape Bilateral:;round   Breast Density Bilateral:;filling   Areola Bilateral:;elastic   Nipples Bilateral:;everted   Left Nipple Symptoms tender   Right Nipple Symptoms tender   Maternal Infant Feeding   Maternal Emotional State relaxed;assist needed   Infant Positioning clutch/football   Signs of Milk Transfer audible swallow;infant jaw motion present   Pain with Feeding no   Latch Assistance yes   Community Referrals   Community Referrals outpatient lactation program;support group     Assisted pt with breastfeeding baby. Baby able to latch to both breast in football hold. Good tugs and pulls observed. Pt shown how to keep baby actively breastfeeding. Recommended pt to continue to use breastpump 2-4 times daily after feedings for 10 minutes.  Discharge lactation education provided. Questions answered. Pt has lactation warmline number and community resources.

## 2024-05-22 NOTE — DISCHARGE SUMMARY
[facility] ([place])  Obstetrics  Discharge Summary      Patient Name: Cole Jackson  MRN: [id number]  Admission Date: 2024  Hospital Length of Stay: 3 days  Discharge Date and Time:  2024 10:00 AM  Attending Physician: Laura Sunshine MD   Discharging Provider: Jade Harmon CNM   Primary Care Provider: Evert Pearson MD    HPI: Cole Jackson is a 23 y.o. female  at 38w0d with Estimated Date of Delivery: 24 based on 8 wk sono who presents to Labor and Delivery accompanied by her mother and father. Expecting a boy- [place]!    Mild contractions, active fetal movement, no vaginal bleeding, no loss of fluid.     This pregnancy has been complicated by FGR (EFW 12%, AC 6%, 2619g at 37+1)     Presentation: Cephalic by BSUS  Estimated Fetal Weight: 6 lb    Birth Center Risk Assessment: 1-management on labor and Delivery    CNM management in ABC  CNM management on L&D  Consultation with OB to develop  plan of care  Collaborative CNM/OB management with delivery on L&D   4-   Permanent referral of care to MD          * No surgery found *     Hospital Course:   SVE 1.5/60/-2 @ 1300, rich placed without difficulty  High dose pitocin discussed with physician team and ordered  Standard IOL labs (CBC, T&S, TPA)  Desires unmedicated birth, open to nitrous, discussed no IV pain medications in labor and in light of FGR  Hydrotherapy PRN in active labor  Recheck in 4 hours or sooner PRN, AROM when feasible    24 PPD #1, doing ok. Struggling with breastfeeding.   24 PPD2 - doing well today, normal lochia, pain controlled with PO meds. Breastfeeding still painful, working with lactation. Discharge home today.     Consults (From admission, onward)          Status Ordering Provider     Inpatient consult to Social Work  Once        Provider:  (Not yet assigned)    Completed RAYMON SRINIVASAN            Final Active Diagnoses:    Diagnosis Date Noted POA    PRINCIPAL PROBLEM:    "(spontaneous vaginal delivery) [O80] 2024 Not Applicable    Difficulty of mother performing breastfeeding [Z39.1] 2024 Not Applicable      Problems Resolved During this Admission:    Diagnosis Date Noted Date Resolved POA    Encounter for planned induction of labor [Z34.90] 2024 Not Applicable    Fetal growth restriction antepartum [O36.5990] 2024 Yes        Significant Diagnostic Studies: Labs: CBC No results for input(s): "WBC", "HGB", "HCT", "PLT" in the last 48 hours.      Feeding Method: breast    Immunizations       Date Immunization Status Dose Route/Site Given by    2447 MMR Incomplete 0.5 mL Subcutaneous/     2447 Tdap Incomplete 0.5 mL Intramuscular/             Delivery:    Episiotomy: None   Lacerations: None   Repair suture: None   Repair # of packets:     Blood loss (ml):       Birth information:  YOB: 2024   Time of birth: 2:32 AM   Sex: male   Delivery type: Vaginal, Spontaneous   Gestational Age: 38w1d     Measurements    Weight: 2790 g  Weight (lbs): 6 lb 2.4 oz  Length: 48.3 cm  Length (in): 19"  Head circumference: 33.5 cm  Chest circumference: 31.1 cm         Delivery Clinician: Delivery Providers    Delivering clinician: Laura Landa CNM   Provider Role    Karishma Collins RN Hilkirk, Jennifer, RN Vicknair, Kristine, RN Gonzalez, Nallyvis, ST              Additional  information:  Forceps:    Vacuum:    Breech:    Observed anomalies      Living?:     Apgars    Living status: Living  Apgar Component Scores:  1 min.:  5 min.:  10 min.:  15 min.:  20 min.:    Skin color:  0  1       Heart rate:  2  2       Reflex irritability:  2  2       Muscle tone:  2  2       Respiratory effort:  2  2       Total:  8  9       Apgars assigned by: JIMMY HOLLIDAY RN         Placenta: Delivered:       appearance  Pending Diagnostic Studies:       Procedure Component Value Units Date/Time    Specimen to Pathology, Surgery " Gynecology and Obstetrics [[id number]] Collected: 05/20/24 0352    Order Status: Sent Lab Status: In process Updated: 05/21/24 0830    Specimen: Tissue             Discharged Condition: stable    Disposition: Home or Self Care    Follow Up:   Follow-up Information       [facility]    Specialty: Obstetrics and Gynecology  Why: Postpartum visit  Contact information:  [address] [facility] [address] [facility] 4th Floor  [facility] [address] [phone]  Additional information:  Turn at Entrance 1 on [facility] in [facility] and take elevators to Floor 2. Follow signs to Hospital. Take Hospital Elevators to Floor 4 for [address].                         Patient Instructions:      BREAST PUMP FOR HOME USE     Order Specific Question Answer Comments   Type of pump: Electric    Weight: 95.7 kg (210 lb 15.7 oz)    Length of need (1-99 months): 99      Diet Adult Regular     Pelvic Rest     Notify your health care provider if you experience any of the following:  temperature >100.4     Notify your health care provider if you experience any of the following:  persistent nausea and vomiting or diarrhea     Notify your health care provider if you experience any of the following:  severe uncontrolled pain     Notify your health care provider if you experience any of the following:  redness, tenderness, or signs of infection (pain, swelling, redness, odor or green/yellow discharge around incision site)     Notify your health care provider if you experience any of the following:  difficulty breathing or increased cough     Notify your health care provider if you experience any of the following:  severe persistent headache     Notify your health care provider if you experience any of the following:  worsening rash     Notify your health care provider if you experience any of the following:  persistent dizziness, light-headedness, or visual  disturbances     Notify your health care provider if you experience any of the following:  increased confusion or weakness     Notify your health care provider if you experience any of the following:     Activity as tolerated     Medications:  Current Discharge Medication List        START taking these medications    Details   acetaminophen (TYLENOL) 325 MG tablet Take 2 tablets (650 mg total) by mouth every 4 (four) hours as needed for Pain.  Qty: 30 tablet, Refills: 0           CONTINUE these medications which have NOT CHANGED    Details   cetirizine (ZYRTEC) 10 MG tablet Take 1 tablet (10 mg total) by mouth once daily.  Qty: 30 tablet, Refills: 0      Lactobacillus acidophilus (PROBIOTIC ORAL) Take by mouth.      nystatin (MYCOSTATIN) cream Apply topically 2 (two) times daily. for 7 days  Qty: 30 g, Refills: 1      PNV no.95/ferrous fum/folic ac (PRENATAL ORAL) Take by mouth.             Jade Harmon CNM  Obstetrics  [facility] ([place])

## 2024-05-22 NOTE — PLAN OF CARE
Pt ambulating, voiding, and passing flatus. Pt tolerating PO well and no SS of distress at this time. Pt's pain well controlled throughout shift by oral pain medication. Pts bleeding has been light throughout shift. Fundus is firm. Mother baby care guide reviewed. All questions answered. Pt verbalized understanding to follow up with OB 4-6 weeks.  Reviewed medication list, when to call provider, and SS of infection. Pt stable at this time. ID band verified. All safety measures in place.

## 2024-05-22 NOTE — PROGRESS NOTES
[facility])  Obstetrics  Postpartum Progress Note    Patient Name: Cole Jackson  MRN: [id number]  Admission Date: 2024  Hospital Length of Stay: 3 days  Attending Physician: Laura Sunshine MD  Primary Care Provider: Evert Pearson MD    Subjective:     Principal Problem: (spontaneous vaginal delivery)    Hospital Course:  SVE 1.5/60/-2 @ 1300, rich placed without difficulty  High dose pitocin discussed with physician team and ordered  Standard IOL labs (CBC, T&S, TPA)  Desires unmedicated birth, open to nitrous, discussed no IV pain medications in labor and in light of FGR  Hydrotherapy PRN in active labor  Recheck in 4 hours or sooner PRN, AROM when feasible    24 PPD #1, doing ok. Struggling with breastfeeding.   24 PPD2 - doing well today, normal lochia, pain controlled with PO meds. Breastfeeding still painful, working with lactation. Discharge home today.    Interval History: PPD2    She is doing well this morning. She is tolerating a regular diet without nausea or vomiting. She is voiding spontaneously. She is ambulating. She has passed flatus, and has not a BM. Vaginal bleeding is mild. She denies fever or chills. Abdominal pain is mild and controlled with oral medications. She Is breastfeeding. She desires circumcision for her male baby: yes - already done.    Objective:     Vital Signs (Most Recent):  Temp: 98.8 °F (37.1 °C) (24 0831)  Pulse: 63 (24 0831)  Resp: 18 (24 0831)  BP: 102/61 (24 0831)  SpO2: 97 % (24 0831) Vital Signs (24h Range):  Temp:  [97.4 °F (36.3 °C)-98.8 °F (37.1 °C)] 98.8 °F (37.1 °C)  Pulse:  [63-72] 63  Resp:  [17-18] 18  SpO2:  [97 %-99 %] 97 %  BP: ()/(55-61) 102/61     Weight: 95.7 kg (210 lb 15.7 oz)  Body mass index is 34.05 kg/m².    No intake or output data in the 24 hours ending 24 0957      Significant Labs:  Lab Results   Component Value Date    [facility] B POS 2024    STREPBCULT No  "Group B Streptococcus isolated 2024     No results for input(s): "HGB", "HCT" in the last 48 hours.    I have personallly reviewed all pertinent lab results from the last 24 hours.    Physical Exam:   Constitutional: She is oriented to person, place, and time. She appears well-developed and well-nourished.    HENT:   Head: Normocephalic and atraumatic.    Eyes: Conjunctivae are normal.     Cardiovascular:  Normal rate.             Pulmonary/Chest: Effort normal.        Abdominal: Soft. There is no abdominal tenderness.     Genitourinary: There is vaginal discharge (lochia) in the vagina.           Musculoskeletal: Normal range of motion.       Neurological: She is alert and oriented to person, place, and time.    Skin: Skin is warm and dry.    Psychiatric: She has a normal mood and affect. Her behavior is normal. Judgment and thought content normal.       Review of Systems   Constitutional:  Negative for chills and fever.   Eyes: Negative.    Respiratory: Negative.     Cardiovascular: Negative.    Gastrointestinal:  Positive for abdominal pain (cramping). Negative for nausea and vomiting.   Endocrine: Negative.    Genitourinary:  Positive for vaginal bleeding (lochia). Negative for vaginal odor.   Musculoskeletal: Negative.    Integumentary:  Negative.   Neurological: Negative.    Hematological: Negative.    Psychiatric/Behavioral: Negative.     Breast: negative.      Assessment/Plan:     23 y.o. female  for:    *  (spontaneous vaginal delivery)  24 PPD2 - doing well today, normal lochia, pain controlled with PO meds. Breastfeeding still painful, working with lactation. Discharge home today.    Difficulty of mother performing breastfeeding  Working with lactation  Discussed outpatient resources        Disposition: As patient meets milestones, will plan to discharge today.    Jade Harmon CNM  Obstetrics  [facility]      "

## 2024-05-23 ENCOUNTER — PATIENT MESSAGE (OUTPATIENT)
Dept: OBSTETRICS AND GYNECOLOGY | Facility: OTHER | Age: 24
End: 2024-05-23
Payer: MEDICAID

## 2024-05-24 LAB
FINAL PATHOLOGIC DIAGNOSIS: NORMAL
GROSS: NORMAL
Lab: NORMAL

## 2024-05-28 ENCOUNTER — TELEPHONE (OUTPATIENT)
Dept: PSYCHIATRY | Facility: CLINIC | Age: 24
End: 2024-05-28
Payer: MEDICAID

## 2024-06-06 ENCOUNTER — OFFICE VISIT (OUTPATIENT)
Dept: OBSTETRICS AND GYNECOLOGY | Facility: CLINIC | Age: 24
End: 2024-06-06
Payer: MEDICAID

## 2024-06-06 DIAGNOSIS — Z91.89 AT RISK FOR DEPRESSED MOOD DURING POSTPARTUM PERIOD: Primary | ICD-10-CM

## 2024-06-06 PROCEDURE — 99213 OFFICE O/P EST LOW 20 MIN: CPT | Mod: 95,,, | Performed by: ADVANCED PRACTICE MIDWIFE

## 2024-06-06 NOTE — PROGRESS NOTES
.Postpartum MOOD evaluation:    Cole Jackson is a 23 y.o. female  is here for a postpartum visit. She is 2 weeks postpartum following a spontaneous vaginal delivery, of a male infant. # 6 lb 2 oz. Appointment is for mood check today, virtually. She reports that she had a lot of anxiety when first went home from the hospital following delivery. This is her first child. She does have help at home and is feels she is doing well. No thoughts of harm to self/others. EPDS Score of 3.    Significant Risk Factors for PPD:    OB History    Para Term  AB Living   2 1 1   1 1   SAB IAB Ectopic Multiple Live Births         0 1      # Outcome Date GA Lbr Braden/2nd Weight Sex Type Anes PTL Lv   2 Term 24 38w1d 13:45 / 00:47 2.79 kg (6 lb 2.4 oz) M Vag-Spont EPI N TAMIKO   1 AB 2021 6w0d    SAB          Postpartum course has been uncomplicated.    Bleeding staining only, dark brown. Bowel/ bladder function is normal.   Desired contraception method is undecided.    Postpartum depression screening: negative. EPDS 3.    Baby's course has been uncomplicated. Baby is feeding by breast.     ROS:  GENERAL: No fever, chills, fatigability.  VULVAR: No pain, no lesions and no itching.  VAGINAL: No relaxation, no itching, no discharge, no abnormal bleeding and no lesions.  ABDOMEN: No abdominal pain. Denies nausea. Denies vomiting. No diarrhea. No constipation  BREAST: Denies pain. No lumps. No discharge.  URINARY: No incontinence, no nocturia, no frequency and no dysuria.  CARDIOVASCULAR: No chest pain. No shortness of breath. No leg cramps.  NEUROLOGICAL: No headaches. No vision changes.    Past Medical History:   Diagnosis Date    Fetal growth restriction antepartum 2024    12% with AC 6% - delivery [id number]       Past Surgical History:   Procedure Laterality Date    TONSILLECTOMY       Review of patient's allergies indicates:   Allergen Reactions    Peanut Itching and Rash     Only when eaten -  itching throat and eyes, but subsides with benadryl; epipen       Current Outpatient Medications:     acetaminophen (TYLENOL) 325 MG tablet, Take 2 tablets (650 mg total) by mouth every 4 (four) hours as needed for Pain., Disp: 30 tablet, Rfl: 0    cetirizine (ZYRTEC) 10 MG tablet, Take 1 tablet (10 mg total) by mouth once daily., Disp: 30 tablet, Rfl: 0    Lactobacillus acidophilus (PROBIOTIC ORAL), Take by mouth., Disp: , Rfl:     nystatin (MYCOSTATIN) cream, Apply topically 2 (two) times daily. for 7 days, Disp: 30 g, Rfl: 1    PNV no.95/ferrous fum/folic ac (PRENATAL ORAL), Take by mouth., Disp: , Rfl:       There were no vitals filed for this visit.    PHYSICAL EXAM:  Constitutional/Gen: NAD, appears stated age, well groomed  Neurologic: A&O x 4, non-focal, cranial nerves 2-12 grossly intact  Psych: affect appropriate and without signs of mood, thought or memory difficulty appreciated    Diagnoses and all orders for this visit:    At risk for depressed mood during postpartum period-stable    At risk for PPD:   Patient denies homicidal or suicidal ideations. Counseling encouraged and references offered.   Educated about adjunct treatments including but not limited to avoidance of stimulants including caffeine, participation in regular exercise, yoga, meditation, and breathing exercises and daily exposure to sun/light therapy. Vitamin B6 daily may be helpful in some cases and safe to take on regular basis, and/or B-complex.  Also discussed setting aside at least 1hr/week for which she has to herself (without childcare or other responsibilities) for which she can participate in self-care.     PP Community resources/ counseling references discussed.   [facility] support groups Encouraged:    - Snuggles and Struggles @ [facility]   - Beyond the Blues @ [employer]   - Cafe Au Lait @ [facility]       Routine follow up in 2-4wks for routine postpartum appt, sooner if  needed.    Carmelina Lee CNM, MSN  06/06/2024  4:09 PM

## 2024-06-07 ENCOUNTER — OFFICE VISIT (OUTPATIENT)
Dept: PSYCHIATRY | Facility: CLINIC | Age: 24
End: 2024-06-07
Payer: MEDICAID

## 2024-06-07 DIAGNOSIS — Z86.59 HISTORY OF DEPRESSION: ICD-10-CM

## 2024-06-07 DIAGNOSIS — F41.8 POSTPARTUM ANXIETY: Primary | ICD-10-CM

## 2024-06-07 PROCEDURE — 1159F MED LIST DOCD IN RCRD: CPT | Mod: CPTII,95,, | Performed by: INTERNAL MEDICINE

## 2024-06-07 PROCEDURE — 1160F RVW MEDS BY RX/DR IN RCRD: CPT | Mod: CPTII,95,, | Performed by: INTERNAL MEDICINE

## 2024-06-07 PROCEDURE — 99204 OFFICE O/P NEW MOD 45 MIN: CPT | Mod: AF,HB,95, | Performed by: INTERNAL MEDICINE
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
## 2024-07-02 ENCOUNTER — POSTPARTUM VISIT (OUTPATIENT)
Dept: OBSTETRICS AND GYNECOLOGY | Facility: CLINIC | Age: 24
End: 2024-07-02
Payer: MEDICAID

## 2024-07-02 VITALS
HEIGHT: 66 IN | WEIGHT: 197 LBS | SYSTOLIC BLOOD PRESSURE: 128 MMHG | BODY MASS INDEX: 31.66 KG/M2 | HEART RATE: 70 BPM | DIASTOLIC BLOOD PRESSURE: 76 MMHG

## 2024-07-02 DIAGNOSIS — Z11.3 ENCOUNTER FOR SCREENING EXAMINATION FOR SEXUALLY TRANSMITTED INFECTION: ICD-10-CM

## 2024-07-02 DIAGNOSIS — Z12.4 CERVICAL CANCER SCREENING: ICD-10-CM

## 2024-07-02 DIAGNOSIS — F41.8 POSTPARTUM ANXIETY: ICD-10-CM

## 2024-07-02 DIAGNOSIS — Z91.89 AT RISK FOR DEPRESSED MOOD DURING POSTPARTUM PERIOD: ICD-10-CM

## 2024-07-02 DIAGNOSIS — Z30.014 ENCOUNTER FOR INITIAL PRESCRIPTION OF INTRAUTERINE CONTRACEPTIVE DEVICE (IUD): ICD-10-CM

## 2024-07-02 PROBLEM — O36.8390 FETAL ARRHYTHMIA AFFECTING PREGNANCY, ANTEPARTUM: Status: RESOLVED | Noted: 2024-02-29 | Resolved: 2024-07-02

## 2024-07-02 PROBLEM — Z34.90 PREGNANCY: Status: RESOLVED | Noted: 2024-05-08 | Resolved: 2024-07-02

## 2024-07-02 PROCEDURE — 0503F POSTPARTUM CARE VISIT: CPT | Mod: CPTII,,,

## 2024-07-02 PROCEDURE — 99213 OFFICE O/P EST LOW 20 MIN: CPT | Mod: PBBFAC,TH

## 2024-07-02 PROCEDURE — 87491 CHLMYD TRACH DNA AMP PROBE: CPT

## 2024-07-02 PROCEDURE — 99999 PR PBB SHADOW E&M-EST. PATIENT-LVL III: CPT | Mod: PBBFAC,,,

## 2024-07-02 PROCEDURE — 81514 NFCT DS BV&VAGINITIS DNA ALG: CPT

## 2024-07-02 PROCEDURE — 88175 CYTOPATH C/V AUTO FLUID REDO: CPT

## 2024-07-02 RX ORDER — ESCITALOPRAM OXALATE 10 MG/1
10 TABLET ORAL DAILY
Qty: 90 TABLET | Refills: 3 | Status: SHIPPED | OUTPATIENT
Start: 2024-07-02 | End: 2025-07-02

## 2024-07-02 NOTE — PROGRESS NOTES
Postpartum Visit  Cole Jackson is a 23 y.o. female  is here for a postpartum visit. She is 6 weeks postpartum following a spontaneous vaginal delivery, of a male infant weighinlb 2oz, with Anesthesia: epidural. The delivery was at 38w1d. She had no lacerations.    Pregnancy was complicated by: FGR and fetal arrhythmia.      Postpartum course has been uncomplicated.    Bleeding no bleeding. Bowel/ bladder function is normal.   She has never had a pap smear and is due for one.    Patient is sexually active. Desires full panel STI testing. Undecided whether she will have blood drawn today.  Desired contraception method is mirena.     Postpartum depression screening: negative. EPDS 13.    Baby's course has been uncomplicated. Baby is feeding by both breast and bottle -  Similac total comfort . Difficulty with latch. She is pumping x2-3 weeks.    ROS:  GENERAL: No fever, chills, fatigability.  VULVAR: No pain, no lesions and no itching.  VAGINAL: No relaxation, no itching, no discharge, no abnormal bleeding and no lesions.  ABDOMEN: No abdominal pain. Denies nausea. Denies vomiting. No diarrhea. No constipation  BREAST: Denies pain. No lumps. No discharge.  URINARY: No incontinence, no nocturia, no frequency and no dysuria.  CARDIOVASCULAR: No chest pain. No shortness of breath. No leg cramps.  NEUROLOGICAL: No headaches. No vision changes.    Past Medical History:   Diagnosis Date    Fetal growth restriction antepartum 2024    12% with AC 6% - delivery [id number]       (spontaneous vaginal delivery) 2024     Past Surgical History:   Procedure Laterality Date    TONSILLECTOMY       Review of patient's allergies indicates:   Allergen Reactions    Peanut Itching and Rash     Only when eaten - itching throat and eyes, but subsides with benadryl; epipen    Chamomile flower Hives and Itching       Current Outpatient Medications:     acetaminophen (TYLENOL) 325 MG tablet, Take 2 tablets (650 mg  total) by mouth every 4 (four) hours as needed for Pain., Disp: 30 tablet, Rfl: 0    Lactobacillus acidophilus (PROBIOTIC ORAL), Take by mouth., Disp: , Rfl:     PNV no.95/ferrous fum/folic ac (PRENATAL ORAL), Take by mouth., Disp: , Rfl:     cetirizine (ZYRTEC) 10 MG tablet, Take 1 tablet (10 mg total) by mouth once daily., Disp: 30 tablet, Rfl: 0    EScitalopram oxalate (LEXAPRO) 10 MG tablet, Take 1 tablet (10 mg total) by mouth once daily., Disp: 90 tablet, Rfl: 3    nystatin (MYCOSTATIN) cream, Apply topically 2 (two) times daily. for 7 days, Disp: 30 g, Rfl: 1      Vitals:    07/02/24 0914   BP: 128/76   Pulse: 70       General appearance - alert, well appearing, and in no distress, oriented to person, place, and time, and normal appearing weight  Mental status - alert, oriented to person, place, and time, depressed mood  Skin - coloration normal for race, good turgor, warm to touch, no rashes  Abdomen - soft, nontender, nondistended, no masses or organomegaly  Pelvic -   External genitalia postpartum: normal, well-healed, without lesions or masses.  Normal female hair distribution. Adequate perineal body. Urethral meatus without lesions or prolapse. Urethra: no masses, tenderness, or scarring.  Bladder: without tenderness or masses.  Vaginal mucosa moist and pink, normal rugae, without lesions, abnormal discharge, or foul odor.  Small 4mm mucosal tag to posterior introitus. Cervix pink, no lesions, no cervical motion tenderness.  Uterus: midline, non tender, smooth, not enlarged, not prolapsed  No adnexal masses or tenderness.  Extremities - no edema, redness or tenderness in the calves or thighs      Cole was seen today for postpartum care.    Diagnoses and all orders for this visit:    Encounter for routine postpartum follow-up    At risk for depressed mood during postpartum period    Encounter for initial prescription of intrauterine contraceptive device (IUD)  -     Device Authorization Order    Cervical  "cancer screening  -     Liquid-Based Pap Smear, Screening    Postpartum depression  -     EScitalopram oxalate (LEXAPRO) 10 MG tablet; Take 1 tablet (10 mg total) by mouth once daily.    Encounter for screening examination for sexually transmitted infection  -     C. trachomatis/N. gonorrhoeae by AMP DNA [facility]; Cervicovaginal  -     Vaginosis Screen by DNA Probe; Future    Postpartum anxiety      Discussed contraception - pt desires Mirena. Will schedule to RTC in 2 weeks.  Lexapro 10mg initiated for PPD and PPA. Patient cancelled psych appt; discussed need to reschedule and begin therapy. She declines Lexapro at this time and desires to start therapy and see if time helps. Discussed risks of untreated PPD/PPA. Mood check to be performed with IUD insertion in 2 weeks. Discussed abstinence x2 weeks for IUD. She endorses some family support but endorses most stress is at night with caring for , "I'm all by myself". Encouraged her to communicate with support system about need for more support with feedings and  care given she is formula feeding and pumping.  Counseling regarding resuming normal activities of exercise and work.  Pap smear today.  Postpartum precautions reviewed    IUD insertion/mood check in 2 weeks.  Routine follow up in 1 yr    Laura Landa CNM        "

## 2024-07-03 DIAGNOSIS — B37.31 VULVOVAGINAL CANDIDIASIS: Primary | ICD-10-CM

## 2024-07-03 LAB
BACTERIAL VAGINOSIS DNA: NEGATIVE
C TRACH DNA SPEC QL NAA+PROBE: NOT DETECTED
CANDIDA GLABRATA DNA: NEGATIVE
CANDIDA KRUSEI DNA: NEGATIVE
CANDIDA RRNA VAG QL PROBE: POSITIVE
CLINICAL INFO: NORMAL
DATE OF PREVIOUS PAP: NO
DATE PREVIOUS BX: NO
LMP START DATE: NORMAL
N GONORRHOEA DNA SPEC QL NAA+PROBE: NOT DETECTED
SPECIMEN SOURCE CVX/VAG CYTO: NORMAL
T VAGINALIS RRNA GENITAL QL PROBE: NEGATIVE

## 2024-07-03 RX ORDER — MICONAZOLE NITRATE 200 MG/1
200 SUPPOSITORY VAGINAL NIGHTLY
Qty: 3 SUPPOSITORY | Refills: 0 | Status: SHIPPED | OUTPATIENT
Start: 2024-07-03

## 2024-07-05 ENCOUNTER — PATIENT MESSAGE (OUTPATIENT)
Dept: OBSTETRICS AND GYNECOLOGY | Facility: CLINIC | Age: 24
End: 2024-07-05
Payer: MEDICAID

## 2024-07-06 DIAGNOSIS — B37.31 CANDIDA VAGINITIS: Primary | ICD-10-CM

## 2024-07-06 RX ORDER — FLUCONAZOLE 150 MG/1
150 TABLET ORAL ONCE
Qty: 1 TABLET | Refills: 0 | Status: SHIPPED | OUTPATIENT
Start: 2024-07-06 | End: 2024-07-06

## 2025-08-02 ENCOUNTER — HOSPITAL ENCOUNTER (EMERGENCY)
Facility: HOSPITAL | Age: 25
Discharge: HOME | End: 2025-08-02
Attending: EMERGENCY MEDICINE
Payer: MEDICAID

## 2025-08-02 VITALS
OXYGEN SATURATION: 98 % | RESPIRATION RATE: 18 BRPM | SYSTOLIC BLOOD PRESSURE: 117 MMHG | WEIGHT: 180 LBS | HEIGHT: 64 IN | HEART RATE: 73 BPM | BODY MASS INDEX: 30.73 KG/M2 | TEMPERATURE: 98 F | DIASTOLIC BLOOD PRESSURE: 62 MMHG

## 2025-08-02 DIAGNOSIS — R09.82 POSTNASAL DRIP: Primary | ICD-10-CM

## 2025-08-02 DIAGNOSIS — Z77.120 SUSPECTED EXPOSURE TO MOLD: ICD-10-CM

## 2025-08-02 PROBLEM — K21.9 GASTROESOPHAGEAL REFLUX DISEASE: Status: ACTIVE | Noted: 2024-01-11

## 2025-08-02 LAB
B-HCG UR QL: NEGATIVE
CTP QC/QA: YES
POC MOLECULAR INFLUENZA A AGN: NEGATIVE
POC MOLECULAR INFLUENZA B AGN: NEGATIVE
SARS-COV-2 RDRP RESP QL NAA+PROBE: NEGATIVE

## 2025-08-02 PROCEDURE — 87502 INFLUENZA DNA AMP PROBE: CPT

## 2025-08-02 PROCEDURE — 81025 URINE PREGNANCY TEST: CPT | Performed by: PHYSICIAN ASSISTANT

## 2025-08-02 PROCEDURE — 99283 EMERGENCY DEPT VISIT LOW MDM: CPT

## 2025-08-02 PROCEDURE — 87635 SARS-COV-2 COVID-19 AMP PRB: CPT | Performed by: EMERGENCY MEDICINE

## 2025-08-02 RX ORDER — PROMETHAZINE HYDROCHLORIDE AND DEXTROMETHORPHAN HYDROBROMIDE 6.25; 15 MG/5ML; MG/5ML
5 SYRUP ORAL EVERY 4 HOURS PRN
Qty: 118 ML | Refills: 0 | Status: SHIPPED | OUTPATIENT
Start: 2025-08-02 | End: 2025-08-12

## 2025-08-02 RX ORDER — OXYMETAZOLINE HCL 0.05 %
1 SPRAY, NON-AEROSOL (ML) NASAL 2 TIMES DAILY
Qty: 15 ML | Refills: 0 | Status: SHIPPED | OUTPATIENT
Start: 2025-08-02 | End: 2025-08-05

## 2025-08-02 NOTE — DISCHARGE INSTRUCTIONS
Thank you for coming to our Emergency Department today. It is important to remember that some problems or medical conditions are difficult to diagnose and may not be found or addressed during your Emergency Department visit.  These conditions often start with non-specific symptoms and can only be diagnosed on follow up visits with your primary care physician or specialist when the symptoms continue or change. Please remember that all medical conditions can change, and we cannot predict how you will be feeling tomorrow or the next day. Return to the ER with any questions/concerns, new/concerning symptoms, worsening or failure to improve.       Be sure to follow up with your primary care doctor and review all labs/imaging/tests that were performed during your ER visit with them. It is very common for us to identify non-emergent incidental findings which must be followed up with your primary care physician.  Some labs/imaging/tests may be outside of the normal range, and require non-emergent follow-up and/or further investigation/treatment/procedures/testing to help diagnose/exclude/prevent complications or other potentially serious medical conditions. Some abnormalities may not have been discussed or addressed during your ER visit.     An ER visit does not replace a primary care visit, and many screening tests or follow-up tests cannot be ordered by an ER doctor or performed by the ER. Some tests may even require pre-approval.    If you do not have a primary care doctor, you may contact the one listed on your discharge paperwork or you may also call the [facility] Appointment Desk at [phone] , or [address] [place] [address] at  [phone] to schedule an appointment, or establish care with a primary care doctor or even a specialist and to obtain information about local resources. It is important to your health that you have a primary care doctor.    Please take all medications as directed. We have done our best to select  a medication for you that will treat your condition however, all medications may potentially have side-effects and it is impossible to predict which medications may give you side-effects or what those side-effects (if any) those medications may give you.  If you feel that you are having a negative effect or side-effect of any medication you should stop taking those medications immediately and seek medical attention. If you feel that you are having a life-threatening reaction call 911.        Do not drive, swim, climb to height, take a bath, operate heavy machinery, drink alcohol or take potentially sedating medications, sign any legal documents or make any important decisions for 24 hours if you have received any pain medications, sedatives or mood altering drugs during your ER visit or within 24 hours of taking them if they have been prescribed to you.     You can find additional resources for Dentists, hearing aids, durable medical equipment, low cost pharmacies and other resources at [email or web address]

## 2025-08-02 NOTE — ED PROVIDER NOTES
Encounter Date: 2025    SCRIBE #1 NOTE: I, Russell Orourke, am scribing for, and in the presence of,  Vinny Yeboah PA-C. I have scribed the following portions of the note - Other sections scribed: HPI, ROS.       History     Chief Complaint   Patient presents with    Sore Throat     Pt to ER with reports of sore throat, cough with chest pains with coughing. Pt reports hx of reflux. + mold in apartment.      Patient is a 24 y.o. female, with no pertinent PMHx, who presents to the ED with sore throat, cough, and generalized weakness onset 1 week ago. Pt has a sick child at home with similar symptoms. Pt states there has been considerable mold in her apartment which she believes is exacerbating symptoms. Patient reports using liquid IV with no relief from weakness. No other exacerbating or alleviating factors. Denies n/v, fever, SOB or other associated symptoms.       The history is provided by the patient. No  was used.     Review of patient's allergies indicates:   Allergen Reactions    Peanut Itching and Rash     Only when eaten - itching throat and eyes, but subsides with benadryl; epipen    Chamomile flower Hives and Itching     Past Medical History:   Diagnosis Date    Fetal growth restriction antepartum 2024    12% with AC 6% - delivery [id number]       (spontaneous vaginal delivery) 2024     Past Surgical History:   Procedure Laterality Date    TONSILLECTOMY  2013     Family History   Problem Relation Name Age of Onset    No Known Problems Paternal Grandmother      No Known Problems Father      No Known Problems Brother      Ovarian cancer Neg Hx      Diabetes Neg Hx       Social History[1]  Review of Systems   Constitutional:  Negative for fever.   HENT:  Positive for sore throat.    Eyes:  Negative for pain.   Respiratory:  Positive for cough. Negative for shortness of breath.    Cardiovascular:  Negative for chest pain.   Gastrointestinal:  Negative for abdominal  pain, nausea and vomiting.   Genitourinary:  Negative for dysuria.   Musculoskeletal:  Negative for back pain.   Skin:  Negative for rash.   Neurological:  Positive for weakness (generalized). Negative for headaches.   Hematological:         No bleeding       Physical Exam     Initial Vitals [08/02/25 0807]   BP Pulse Resp Temp SpO2   117/62 73 18 98 °F (36.7 °C) 98 %      MAP       --         Physical Exam    Nursing note and vitals reviewed.  Constitutional: She appears well-developed and well-nourished. She is not diaphoretic. No distress.   HENT:   Head: Atraumatic.   Right Ear: External ear normal.   Left Ear: External ear normal.   Cobblestoning to the posterior oropharynx that is otherwise normal.  TMs, ear canals, and mastoids normal.   Eyes: Conjunctivae and EOM are normal.   Neck: No tracheal deviation present. No JVD present.   Normal range of motion.  Cardiovascular:  Normal rate and regular rhythm.           Pulmonary/Chest: No accessory muscle usage or stridor. No tachypnea. No respiratory distress.   Musculoskeletal:         General: Normal range of motion.      Cervical back: Normal range of motion.     Neurological: She is alert and oriented to person, place, and time. She displays no tremor. She displays no seizure activity. Coordination and gait normal.   Skin: Skin is intact. No rash noted. No pallor.         ED Course   Procedures  Labs Reviewed   POCT URINE PREGNANCY       Result Value    POC Preg Test, Ur Negative       Acceptable Yes     SARS-COV-2 RDRP GENE    POC Rapid COVID Negative       Acceptable Yes     POCT INFLUENZA A/B MOLECULAR    POC Molecular Influenza A Ag Negative      POC Molecular Influenza B Ag Negative       Acceptable Yes            Imaging Results    None          Medications - No data to display  Medical Decision Making  Postnasal drip likely due to residual viral URI.  No hypoxia or respiratory distress.  Low risk for  bacterial pneumonia.  Evidence of bacterial pharyngitis, deep space infection, or peritonsillar abscess.  COVID-19 flu from triage negative.    Amount and/or Complexity of Data Reviewed  Labs: ordered. Decision-making details documented in ED Course.    Risk  OTC drugs.  Prescription drug management.            Scribe Attestation:   Scribe #1: I performed the above scribed service and the documentation accurately describes the services I performed. I attest to the accuracy of the note.                                   Clinical Impression:  Final diagnoses:  [R09.82] Postnasal drip (Primary)  [Z77.120] Suspected exposure to mold     I, Vinny Yeboah PA-C, personally performed the services described in this documentation. All medical record entries made by the scribe were at my direction and in my presence. I have reviewed the chart and agree that the record reflects my personal performance and is accurate and complete.      DISCLAIMER: This note was prepared with [employer] voice recognition transcription software. Garbled syntax, mangled pronouns, and other bizarre constructions may be attributed to that software system.        ED Disposition Condition    Discharge Stable          ED Prescriptions       Medication Sig Dispense Start Date End Date Auth. Provider    promethazine-dextromethorphan (PROMETHAZINE-DM) 6.25-15 mg/5 mL Syrp Take 5 mLs by mouth every 4 (four) hours as needed (cough). 118 mL 8/2/2025 8/12/2025 Vinny Yeboah PA-C    oxymetazoline (AFRIN) 0.05 % nasal spray 1 spray by Nasal route 2 (two) times daily. DO NOT EXCEED USE FOR MORE THAN 3 DAYS IN A ROW. for 3 days 15 mL 8/2/2025 8/5/2025 Vinny Yeboah PA-C    benzocaine-menthoL 15-20 mg Lozg Follows directions on packaging 16 lozenge 8/2/2025 -- Vinny Yeboah PA-C          Follow-up Information       Follow up With Specialties Details Why Contact [facility] -  Schedule an appointment as soon as possible for a  visit in 2 days For reevaluation, AND to establish primary if you don't have a  [address] [place] [address]  [phone]      [facility] [place] Go to  If symptoms worsen or new symptoms develop [address]  [facility] [place] [address] [phone]                   [1]   Social History  Tobacco Use    Smoking status: Never     Passive exposure: Never    Smokeless tobacco: Never   Substance Use Topics    Alcohol use: No    Drug use: Never        Vinny Yeboah, PA-C  08/02/25 1236

## 2025-08-02 NOTE — Clinical Note
"Cole Meeksbria Jackson was seen and treated in our emergency department on 8/2/2025.  She may return to work on 08/05/2025.       If you have any questions or concerns, please don't hesitate to call.      Vinny Yeboah PA-C"